# Patient Record
Sex: MALE | Race: WHITE | Employment: OTHER | URBAN - METROPOLITAN AREA
[De-identification: names, ages, dates, MRNs, and addresses within clinical notes are randomized per-mention and may not be internally consistent; named-entity substitution may affect disease eponyms.]

---

## 2017-01-23 ENCOUNTER — PATIENT MESSAGE (OUTPATIENT)
Dept: OTOLARYNGOLOGY | Facility: CLINIC | Age: 67
End: 2017-01-23

## 2017-01-23 NOTE — TELEPHONE ENCOUNTER
From: Kali Medina  To: Sundar Baxter MD  Sent: 1/23/2017 1:30 PM CST  Subject: Non-Urgent Medical Question    1.23.17    Dr Viktor Chin    Thank you again for seeing me and the Rxs. I will try to make a follow-up appointment.     Is there anything Rx you c

## 2017-02-02 RX ORDER — CIPROFLOXACIN 500 MG/1
TABLET, FILM COATED ORAL
Qty: 20 TABLET | Refills: 0 | OUTPATIENT
Start: 2017-02-02

## 2017-02-02 NOTE — TELEPHONE ENCOUNTER
Pt's LOV 16 with , chronic otitis externa. Pt states when he saw , culture was done on L ear; was told there is bacteria on L ear.   Pt states the Ciprodex and Ciprofloxacin helped back in 2016; pressure in L ear dropped and ear was dr

## 2017-02-03 NOTE — TELEPHONE ENCOUNTER
pt called. He understands meds will not be filled. Please call.   He still would like to speak with nurse

## 2017-02-06 ENCOUNTER — TELEPHONE (OUTPATIENT)
Dept: OTOLARYNGOLOGY | Facility: CLINIC | Age: 67
End: 2017-02-06

## 2017-02-06 NOTE — TELEPHONE ENCOUNTER
Pt's LOV 16, chronic otitis externa. Pt requested Ciprodex and Cipro refills. Per DIANA, pt should f/u w/ . Pt called today, states he is still having subtle ear aches, drainage from L ear.   Would like to know if  will give him refill of Cipro

## 2017-02-07 RX ORDER — CIPROFLOXACIN AND DEXAMETHASONE 3; 1 MG/ML; MG/ML
4 SUSPENSION/ DROPS AURICULAR (OTIC) 2 TIMES DAILY
Qty: 1 BOTTLE | Refills: 0 | Status: SHIPPED | OUTPATIENT
Start: 2017-02-07 | End: 2017-02-21

## 2017-02-07 RX ORDER — CIPROFLOXACIN 500 MG/1
500 TABLET, FILM COATED ORAL 2 TIMES DAILY
Qty: 20 TABLET | Refills: 0 | Status: SHIPPED | OUTPATIENT
Start: 2017-02-07 | End: 2017-02-17

## 2017-03-07 ENCOUNTER — TELEPHONE (OUTPATIENT)
Dept: OPHTHALMOLOGY | Facility: CLINIC | Age: 67
End: 2017-03-07

## 2017-03-07 NOTE — TELEPHONE ENCOUNTER
Pt requesting a new eyeglass Rx. But pt is asking if he will need a new exam? LOV: 8/10/2016 per Dr. Lexi Mehta notes, pt is to be seen w/Dr. Victoria Rios around 6/10/2017. Pt still requests to spk to Dr. Vicki Elder re: f/u appt. Please call, thank you.

## 2017-03-08 NOTE — TELEPHONE ENCOUNTER
Spoke to pt and explained that there was only a minimal change from the last Rx but improved his vision. OKd for pt to get the new glasses from June 2016 and if he feels like he wants to come in to see PPS sooner then his yearly exam to call our office.

## 2017-03-27 ENCOUNTER — PATIENT MESSAGE (OUTPATIENT)
Dept: INTERNAL MEDICINE CLINIC | Facility: CLINIC | Age: 67
End: 2017-03-27

## 2017-03-28 ENCOUNTER — PATIENT OUTREACH (OUTPATIENT)
Dept: INTERNAL MEDICINE CLINIC | Facility: CLINIC | Age: 67
End: 2017-03-28

## 2017-03-28 DIAGNOSIS — E11.9 TYPE 2 DIABETES MELLITUS WITHOUT COMPLICATION, WITHOUT LONG-TERM CURRENT USE OF INSULIN (HCC): Primary | ICD-10-CM

## 2017-03-28 NOTE — PROGRESS NOTES
Rec'd call from Juan Valladares . Pt had stopped by yesterday to schedule his initial diabetes education session.   Campos Olson is requesting order for pt to receive educational program.   Per Dr. Ryan Payton prior OV notes, pt is to schedule educatio

## 2017-03-28 NOTE — TELEPHONE ENCOUNTER
From: Eunice Bueno  To: Colten Whaley MD  Sent: 3/27/2017 4:01 PM CDT  Subject: Non-Urgent Medical Question    3.27.17    Dr Ji Common    Is it possible to get a referral to a dermatologist for moles and wart removal?  (I tried OTC - it just sits there and

## 2017-03-29 NOTE — TELEPHONE ENCOUNTER
Please provide contact info for Derm- Dr. Serenity Kendall and Dr. Martha Mitchell.  His insurance does not require a referral.

## 2017-04-13 ENCOUNTER — TELEPHONE (OUTPATIENT)
Dept: INTERNAL MEDICINE CLINIC | Facility: CLINIC | Age: 67
End: 2017-04-13

## 2017-04-13 DIAGNOSIS — E78.00 HYPERCHOLESTEROLEMIA: ICD-10-CM

## 2017-04-13 DIAGNOSIS — E11.9 TYPE 2 DIABETES MELLITUS WITHOUT COMPLICATION, WITHOUT LONG-TERM CURRENT USE OF INSULIN (HCC): Primary | ICD-10-CM

## 2017-04-13 DIAGNOSIS — Z00.00 PHYSICAL EXAM: ICD-10-CM

## 2017-04-17 ENCOUNTER — TELEPHONE (OUTPATIENT)
Dept: INTERNAL MEDICINE CLINIC | Facility: CLINIC | Age: 67
End: 2017-04-17

## 2017-04-17 NOTE — TELEPHONE ENCOUNTER
Okay to order CMP, CBC, glycohemoglobin, lipid profile, TSH with reflex to T4, and urine microalbumin.

## 2017-04-17 NOTE — TELEPHONE ENCOUNTER
LMTCB.  When pt calls back pls inform him that labs have been generated and placed in the system.   Pt needs to fast for 12 hours before getting labs done

## 2017-04-17 NOTE — TELEPHONE ENCOUNTER
Pt is asking for lab orders, pt states he has Px 4/19 and would like to get labs tomorrow morning please.

## 2017-04-18 ENCOUNTER — LAB ENCOUNTER (OUTPATIENT)
Dept: LAB | Age: 67
End: 2017-04-18
Attending: INTERNAL MEDICINE
Payer: MEDICARE

## 2017-04-18 DIAGNOSIS — E78.00 HYPERCHOLESTEROLEMIA: ICD-10-CM

## 2017-04-18 DIAGNOSIS — Z00.00 PHYSICAL EXAM: ICD-10-CM

## 2017-04-18 DIAGNOSIS — E11.9 TYPE 2 DIABETES MELLITUS WITHOUT COMPLICATION, WITHOUT LONG-TERM CURRENT USE OF INSULIN (HCC): ICD-10-CM

## 2017-04-18 PROCEDURE — 80061 LIPID PANEL: CPT

## 2017-04-18 PROCEDURE — 82570 ASSAY OF URINE CREATININE: CPT

## 2017-04-18 PROCEDURE — 80053 COMPREHEN METABOLIC PANEL: CPT

## 2017-04-18 PROCEDURE — 82043 UR ALBUMIN QUANTITATIVE: CPT

## 2017-04-18 PROCEDURE — 84443 ASSAY THYROID STIM HORMONE: CPT

## 2017-04-18 PROCEDURE — 36415 COLL VENOUS BLD VENIPUNCTURE: CPT

## 2017-04-18 PROCEDURE — 84439 ASSAY OF FREE THYROXINE: CPT

## 2017-04-18 PROCEDURE — 85025 COMPLETE CBC W/AUTO DIFF WBC: CPT

## 2017-04-18 PROCEDURE — 83036 HEMOGLOBIN GLYCOSYLATED A1C: CPT

## 2017-04-19 ENCOUNTER — OFFICE VISIT (OUTPATIENT)
Dept: INTERNAL MEDICINE CLINIC | Facility: CLINIC | Age: 67
End: 2017-04-19

## 2017-04-19 ENCOUNTER — HOSPITAL ENCOUNTER (OUTPATIENT)
Dept: ENDOCRINOLOGY | Facility: HOSPITAL | Age: 67
Discharge: HOME | End: 2017-04-19
Attending: INTERNAL MEDICINE
Payer: MEDICARE

## 2017-04-19 VITALS
SYSTOLIC BLOOD PRESSURE: 130 MMHG | HEART RATE: 61 BPM | DIASTOLIC BLOOD PRESSURE: 78 MMHG | WEIGHT: 228.63 LBS | HEIGHT: 74 IN | BODY MASS INDEX: 29.34 KG/M2

## 2017-04-19 VITALS — BODY MASS INDEX: 29.52 KG/M2 | WEIGHT: 230 LBS | HEIGHT: 74 IN

## 2017-04-19 DIAGNOSIS — E03.8 SUBCLINICAL HYPOTHYROIDISM: ICD-10-CM

## 2017-04-19 DIAGNOSIS — E11.69 DYSLIPIDEMIA ASSOCIATED WITH TYPE 2 DIABETES MELLITUS (HCC): ICD-10-CM

## 2017-04-19 DIAGNOSIS — E11.65 TYPE 2 DIABETES MELLITUS WITH HYPERGLYCEMIA, WITHOUT LONG-TERM CURRENT USE OF INSULIN (HCC): Primary | ICD-10-CM

## 2017-04-19 DIAGNOSIS — H40.003 GLAUCOMA SUSPECT, BILATERAL: ICD-10-CM

## 2017-04-19 DIAGNOSIS — E78.5 DYSLIPIDEMIA ASSOCIATED WITH TYPE 2 DIABETES MELLITUS (HCC): ICD-10-CM

## 2017-04-19 DIAGNOSIS — Z72.0 TOBACCO USE: ICD-10-CM

## 2017-04-19 DIAGNOSIS — E11.9 TYPE 2 DIABETES MELLITUS WITHOUT COMPLICATION, WITHOUT LONG-TERM CURRENT USE OF INSULIN (HCC): ICD-10-CM

## 2017-04-19 DIAGNOSIS — Z00.00 ANNUAL PHYSICAL EXAM: Primary | ICD-10-CM

## 2017-04-19 DIAGNOSIS — Z00.00 ENCOUNTER FOR ANNUAL HEALTH EXAMINATION: ICD-10-CM

## 2017-04-19 PROCEDURE — G0402 INITIAL PREVENTIVE EXAM: HCPCS | Performed by: INTERNAL MEDICINE

## 2017-04-19 RX ORDER — ATORVASTATIN CALCIUM 20 MG/1
TABLET, FILM COATED ORAL
Qty: 30 TABLET | Refills: 5 | Status: SHIPPED | OUTPATIENT
Start: 2017-04-19 | End: 2017-10-18

## 2017-04-19 NOTE — PROGRESS NOTES
HPI:   Ann Coe is a 73year old male who presents for a Medicare Initial Preventative Physical Exam (Welcome to Medicare- < 12 months on Medicare). He feels well this afternoon, and has no specific issues for discussion.   He went to his first se Lab Results  Component Value Date   WBC 7.2 04/18/2017   HGB 14.6 04/18/2017    04/18/2017        ALLERGIES:   He has No Known Allergies.     CURRENT MEDICATIONS:     Outpatient Prescriptions Marked as Taking for the 4/19/17 encounter (Office Bells Cigarettes. He has a 13 pack-year smoking history. He does not have any smokeless tobacco history on file. He reports that he does not drink alcohol or use illicit drugs.        REVIEW OF SYSTEMS:   GENERAL: No fever  LUNGS: No cough wheezing or shortness misunderstand what they say:  No   I misunderstand what others are saying and make inappropriate responses:    No I avoid social activities because I cannot hear well and fear I will   reply improperly:  No   Family members and friends have told me they th Ophthalmology in June for retinopathy screening. Tobacco cessation discussed and advised. Also recommend regular exercise. Return visit in 6 months for recheck and labs to include glycohemoglobin. Anticipate giving Prevnar at next visit.     Type 2 diab Functional Ability     Bathing or Showering: Able without help    Toileting: Able without help    Dressing: Able without help    Eating: Able without help    Driving: Able without help    Preparing your meals: Able without help    Managing money/bills: SERVICES  INDICATIONS AND SCHEDULE Internal Lab or Procedure External Lab or Procedure   Diabetes Screening      HbgA1C   Annually GLYCOHEMOGLOBIN (HGA1C) (L) (%)   Date Value   08/31/2016 6.6*     GLYCOHEMOGLOBIN (HGA1C) (%)   Date Value   04/18/2017 6.9* Annually POTASSIUM (mmol/L)   Date Value   04/18/2017 4.1     POTASSIUM (P) (mmol/L)   Date Value   04/21/2016 3.9    No flowsheet data found.     Creatinine  Annually CREATININE (mg/dL)   Date Value   04/18/2017 0.99     CREATININE (P) (mg/dL)   Date Value

## 2017-04-19 NOTE — PATIENT INSTRUCTIONS
Please continue your current medications. Continue with diabetes education, and begin checking your blood sugars at home soon. Please try to stop smoking. Please also try to exercise regularly. You will be due for a visit with Ophthalmology in June.   R Routine EKG is not a screening covered service except at the [place] to Medicare Visit    Abdominal aortic aneurysm screening (once between ages 73-68)  No results found for this or any previous visit.  Limited to patients who meet one of the following crit Risk No orders found for this or any previous visit.  Medium/high risk factors:   End-stage renal disease   Hemophiliacs who received Factor VIII or IX concentrates   Clients of institutions for the mentally retarded   Persons who live in the same house as

## 2017-04-19 NOTE — PROGRESS NOTES
Nasra Palacioarturo  : 1950 attended initial assessment for Diabetes Education:    Date: 2017   Start time: 1330 End time: 1430    Ht 74\"  Wt 230 lb  BMI 29.52 kg/m2      GLYCOHEMOGLOBIN (HGA1C) (L) (%)   Date Value   2016 6.6*   ----------

## 2017-04-20 ENCOUNTER — PATIENT MESSAGE (OUTPATIENT)
Dept: INTERNAL MEDICINE CLINIC | Facility: CLINIC | Age: 67
End: 2017-04-20

## 2017-04-21 ENCOUNTER — OFFICE VISIT (OUTPATIENT)
Dept: DERMATOLOGY CLINIC | Facility: CLINIC | Age: 67
End: 2017-04-21

## 2017-04-21 DIAGNOSIS — D22.9 MULTIPLE NEVI: ICD-10-CM

## 2017-04-21 DIAGNOSIS — D23.5 BENIGN NEOPLASM OF SKIN OF TRUNK, EXCEPT SCROTUM: ICD-10-CM

## 2017-04-21 DIAGNOSIS — L82.1 SEBORRHEIC KERATOSES: Primary | ICD-10-CM

## 2017-04-21 DIAGNOSIS — D23.70 BENIGN NEOPLASM OF SKIN OF LOWER LIMB, INCLUDING HIP, UNSPECIFIED LATERALITY: ICD-10-CM

## 2017-04-21 DIAGNOSIS — D23.60 BENIGN NEOPLASM OF SKIN OF UPPER LIMB, INCLUDING SHOULDER, UNSPECIFIED LATERALITY: ICD-10-CM

## 2017-04-21 DIAGNOSIS — D23.4 BENIGN NEOPLASM OF SCALP AND SKIN OF NECK: ICD-10-CM

## 2017-04-21 DIAGNOSIS — D23.30 BENIGN NEOPLASM OF SKIN OF FACE: ICD-10-CM

## 2017-04-21 PROCEDURE — G0463 HOSPITAL OUTPT CLINIC VISIT: HCPCS | Performed by: DERMATOLOGY

## 2017-04-21 PROCEDURE — 99202 OFFICE O/P NEW SF 15 MIN: CPT | Performed by: DERMATOLOGY

## 2017-05-08 NOTE — PROGRESS NOTES
Radha Vu is a 73year old male. HPI:     CC:  Patient presents with:  Lesion: New pt presents with raised lesion on left hand. Pt has tried multiple OTC wart treatments with no results.    Full Skin Exam: Pt requesting full skin exam. Pt concerned wit DAILY WITH MEALS Disp: 60 tablet Rfl: 5   Multiple Vitamins-Minerals (CENTRUM SILVER ADULT 50+) Oral Tab Take 1 tablet by mouth daily. Disp:  Rfl:    Calcium Carbonate-Vitamin D (CALTRATE 600+D OR) Take 1 tablet by mouth daily.  Disp:  Rfl:    Omega-3 Fatty There were no vitals filed for this visit. HPI:    Patient presents with:  Lesion: New pt presents with raised lesion on left hand. Pt has tried multiple OTC wart treatments with no results.    Full Skin Exam: Pt requesting full skin exam. Pt lexi shoulder, unspecified laterality  Benign neoplasm of skin of trunk, except scrotum  Benign neoplasm of skin of lower limb, including hip, unspecified laterality    See details on map. Remarkable for:    Lesion of concern is benign.   Seborrheic keratos

## 2017-05-11 ENCOUNTER — TELEPHONE (OUTPATIENT)
Dept: INTERNAL MEDICINE CLINIC | Facility: CLINIC | Age: 67
End: 2017-05-11

## 2017-05-11 ENCOUNTER — HOSPITAL ENCOUNTER (OUTPATIENT)
Dept: ENDOCRINOLOGY | Facility: HOSPITAL | Age: 67
Discharge: HOME | End: 2017-05-11
Attending: INTERNAL MEDICINE
Payer: MEDICARE

## 2017-05-11 VITALS — WEIGHT: 229.69 LBS | BODY MASS INDEX: 29 KG/M2

## 2017-05-11 PROBLEM — E11.9 TYPE 2 DIABETES MELLITUS (HCC): Status: RESOLVED | Noted: 2017-04-19 | Resolved: 2017-05-11

## 2017-05-11 NOTE — TELEPHONE ENCOUNTER
Please see my chart message:        With Provider: Gee Sierra MD [-Primary Care Physician-]    Preferred Date Range: Any date 5/11/2017 or later    Preferred Times: Any    Reason: To address the following health maintenance concerns.   Adult Pneumonia V

## 2017-05-11 NOTE — PROGRESS NOTES
Ann Coe  DOB12/27/1950 attended Diabetes Education Group Class 1:    Date: 5/11/2017  Start time: 1 PM End time: 3 PM    He is currently monitoring blood sugars BID, FBS: 135-178 mg/dl, Pre-dinner:  mg/dl  He eats 2 meals/day that are 8-9 hrs a

## 2017-05-12 ENCOUNTER — TELEPHONE (OUTPATIENT)
Dept: INTERNAL MEDICINE CLINIC | Facility: CLINIC | Age: 67
End: 2017-05-12

## 2017-05-12 NOTE — TELEPHONE ENCOUNTER
Pt requesting pneumonia injection. Pneumovax 23 given on 5/3/16. Ok to schedule nurse visit for Prevnar 13 injection?

## 2017-05-12 NOTE — TELEPHONE ENCOUNTER
Karen Medicare Billing needs form dated 4/24- physician order diabetic form amended  States Line 3- info changed from 2 to 1- need  Initialed  and date  Medicare will not accept any changes unless initialed & dated  Return FAX# [phone]

## 2017-05-15 ENCOUNTER — NURSE ONLY (OUTPATIENT)
Dept: INTERNAL MEDICINE CLINIC | Facility: CLINIC | Age: 67
End: 2017-05-15

## 2017-05-15 DIAGNOSIS — Z23 NEED FOR PNEUMOCOCCAL VACCINATION: Primary | ICD-10-CM

## 2017-05-15 PROCEDURE — 90670 PCV13 VACCINE IM: CPT | Performed by: INTERNAL MEDICINE

## 2017-05-15 PROCEDURE — G0009 ADMIN PNEUMOCOCCAL VACCINE: HCPCS | Performed by: INTERNAL MEDICINE

## 2017-05-17 ENCOUNTER — OFFICE VISIT (OUTPATIENT)
Dept: PODIATRY CLINIC | Facility: CLINIC | Age: 67
End: 2017-05-17

## 2017-05-17 DIAGNOSIS — M72.2 PLANTAR FASCIITIS OF RIGHT FOOT: ICD-10-CM

## 2017-05-17 DIAGNOSIS — E11.9 TYPE 2 DIABETES MELLITUS WITHOUT COMPLICATION, WITHOUT LONG-TERM CURRENT USE OF INSULIN (HCC): Primary | ICD-10-CM

## 2017-05-17 PROCEDURE — G0463 HOSPITAL OUTPT CLINIC VISIT: HCPCS | Performed by: PODIATRIST

## 2017-05-17 PROCEDURE — 99203 OFFICE O/P NEW LOW 30 MIN: CPT | Performed by: PODIATRIST

## 2017-05-17 NOTE — PROGRESS NOTES
HPI:    Patient ID: Jasbir Guillory is a 73year old male. HPI  This pleasant 21-year-old male presents as a new patient to me on referral from [address] [place] [address]. Patient states that he is here for an evaluation because of diabetes.   Patient states that he has be Rfl:      Allergies:No Known Allergies   PHYSICAL EXAM:   Physical Exam  On physical exam pedal pulses are normal.  There is no clinical evidence of edema nor erythema. Skin texture is good and hair growth is noted on his toes.   His nails are appropriatel

## 2017-05-18 ENCOUNTER — HOSPITAL ENCOUNTER (OUTPATIENT)
Dept: ENDOCRINOLOGY | Facility: HOSPITAL | Age: 67
Discharge: HOME | End: 2017-05-18
Attending: INTERNAL MEDICINE
Payer: MEDICARE

## 2017-05-18 DIAGNOSIS — E11.9 TYPE 2 DIABETES MELLITUS WITHOUT COMPLICATION, WITHOUT LONG-TERM CURRENT USE OF INSULIN (HCC): Primary | ICD-10-CM

## 2017-05-18 NOTE — ADDENDUM NOTE
Encounter addended by: Sophy Aponte on: 5/18/2017  3:47 PM<BR>     Documentation filed: Problem List

## 2017-05-18 NOTE — PROGRESS NOTES
Carlita Isidro  : 1950 attended Diabetes Education Group Class 2:    Date: 2017  Start time: 1300  End time: 1500    Weight: 229.6#    Blood Glucose  FBG = 135-164  Pre-D = 100-125    Healthy eating for diabetes including carbohydrate counting

## 2017-05-24 ENCOUNTER — TELEPHONE (OUTPATIENT)
Dept: INTERNAL MEDICINE CLINIC | Facility: CLINIC | Age: 67
End: 2017-05-24

## 2017-05-24 DIAGNOSIS — E11.65 TYPE 2 DIABETES MELLITUS WITH HYPERGLYCEMIA, WITHOUT LONG-TERM CURRENT USE OF INSULIN (HCC): Primary | ICD-10-CM

## 2017-05-24 NOTE — TELEPHONE ENCOUNTER
Pt calling regarding refill for Glucose Blood (SHRUTI CONTOUR NEXT TEST) In Vitro Strip. .. please advise       Current Outpatient Prescriptions:  Glucose Blood (SHRUTI CONTOUR NEXT TEST) In Vitro Strip Use as directed.   Test BIB before a meal. Disp: 100 strip

## 2017-05-25 ENCOUNTER — OFFICE VISIT (OUTPATIENT)
Dept: SURGERY | Facility: CLINIC | Age: 67
End: 2017-05-25

## 2017-05-25 ENCOUNTER — HOSPITAL ENCOUNTER (OUTPATIENT)
Dept: ENDOCRINOLOGY | Facility: HOSPITAL | Age: 67
Discharge: HOME | End: 2017-05-25
Attending: INTERNAL MEDICINE
Payer: MEDICARE

## 2017-05-25 VITALS
TEMPERATURE: 98 F | DIASTOLIC BLOOD PRESSURE: 85 MMHG | SYSTOLIC BLOOD PRESSURE: 143 MMHG | WEIGHT: 229 LBS | RESPIRATION RATE: 16 BRPM | BODY MASS INDEX: 29.39 KG/M2 | HEIGHT: 74 IN | HEART RATE: 79 BPM

## 2017-05-25 DIAGNOSIS — E11.9 TYPE 2 DIABETES MELLITUS WITHOUT COMPLICATION, WITHOUT LONG-TERM CURRENT USE OF INSULIN (HCC): Primary | ICD-10-CM

## 2017-05-25 DIAGNOSIS — N20.0 KIDNEY STONES: ICD-10-CM

## 2017-05-25 DIAGNOSIS — R35.0 URINARY FREQUENCY: Primary | ICD-10-CM

## 2017-05-25 DIAGNOSIS — R82.90 URINE FINDING: ICD-10-CM

## 2017-05-25 PROCEDURE — 81003 URINALYSIS AUTO W/O SCOPE: CPT | Performed by: UROLOGY

## 2017-05-25 PROCEDURE — 99204 OFFICE O/P NEW MOD 45 MIN: CPT | Performed by: UROLOGY

## 2017-05-25 PROCEDURE — G0463 HOSPITAL OUTPT CLINIC VISIT: HCPCS | Performed by: UROLOGY

## 2017-05-25 NOTE — PROGRESS NOTES
SUBJECTIVE:  Jasbir Guillory is a 73year old male who presents for a consultation at the request of, and a copy of this note will be sent to, Dr. Eric Barragan, for evaluation of  Urinary frequency and ? History of kidney stones.     He states he has frequency on co nocturia, or claudication. GASTROINTESTINAL:  Negative for nausea, vomiting, diarrhea, constipation, heartburn or indigestion, abdominal pains, bloody or tarry stools.   GENERAL: Denies:  weight gain, weight loss, fever, night sweats, bone pain, malaise an

## 2017-05-30 ENCOUNTER — HOSPITAL ENCOUNTER (OUTPATIENT)
Dept: ULTRASOUND IMAGING | Facility: HOSPITAL | Age: 67
Discharge: HOME | End: 2017-05-30
Attending: UROLOGY
Payer: MEDICARE

## 2017-05-30 DIAGNOSIS — R35.0 URINARY FREQUENCY: ICD-10-CM

## 2017-05-30 DIAGNOSIS — N20.0 KIDNEY STONES: ICD-10-CM

## 2017-05-30 PROCEDURE — 76770 US EXAM ABDO BACK WALL COMP: CPT | Performed by: UROLOGY

## 2017-06-01 ENCOUNTER — HOSPITAL ENCOUNTER (OUTPATIENT)
Dept: ENDOCRINOLOGY | Facility: HOSPITAL | Age: 67
Discharge: HOME | End: 2017-06-01
Attending: INTERNAL MEDICINE
Payer: MEDICARE

## 2017-06-01 ENCOUNTER — TELEPHONE (OUTPATIENT)
Dept: ENDOCRINOLOGY | Facility: HOSPITAL | Age: 67
End: 2017-06-01

## 2017-06-01 DIAGNOSIS — E11.69 DYSLIPIDEMIA ASSOCIATED WITH TYPE 2 DIABETES MELLITUS (HCC): Primary | ICD-10-CM

## 2017-06-01 DIAGNOSIS — E78.5 DYSLIPIDEMIA ASSOCIATED WITH TYPE 2 DIABETES MELLITUS (HCC): Primary | ICD-10-CM

## 2017-06-01 NOTE — PROGRESS NOTES
Qiana Laguerre  : 1950 attended Diabetes Education Group Class 4:     Date: 2017  Start time: 1300  End time: 1500    Weight: 230.3#     Reviewed information covered in previous classes including benefits of maintaining good blood glucose contr

## 2017-06-01 NOTE — TELEPHONE ENCOUNTER
----- Message from [facility] sent at 6/1/2017 12:48 PM CDT -----  Regarding: CGM  Patient would like to have the CGM you recommended.    Archana Goldstein

## 2017-06-13 ENCOUNTER — OFFICE VISIT (OUTPATIENT)
Dept: OPTOMETRY | Facility: CLINIC | Age: 67
End: 2017-06-13

## 2017-06-13 ENCOUNTER — OFFICE VISIT (OUTPATIENT)
Dept: SURGERY | Facility: CLINIC | Age: 67
End: 2017-06-13

## 2017-06-13 VITALS
RESPIRATION RATE: 16 BRPM | DIASTOLIC BLOOD PRESSURE: 78 MMHG | HEIGHT: 72 IN | SYSTOLIC BLOOD PRESSURE: 110 MMHG | WEIGHT: 230 LBS | BODY MASS INDEX: 31.15 KG/M2 | TEMPERATURE: 98 F | HEART RATE: 73 BPM

## 2017-06-13 DIAGNOSIS — E11.9 TYPE 2 DIABETES MELLITUS WITHOUT COMPLICATION, WITHOUT LONG-TERM CURRENT USE OF INSULIN (HCC): Primary | ICD-10-CM

## 2017-06-13 DIAGNOSIS — R35.0 URINARY FREQUENCY: Primary | ICD-10-CM

## 2017-06-13 DIAGNOSIS — H25.13 AGE-RELATED NUCLEAR CATARACT OF BOTH EYES: ICD-10-CM

## 2017-06-13 DIAGNOSIS — N20.0 KIDNEY STONES: ICD-10-CM

## 2017-06-13 PROCEDURE — 99213 OFFICE O/P EST LOW 20 MIN: CPT | Performed by: UROLOGY

## 2017-06-13 PROCEDURE — G0463 HOSPITAL OUTPT CLINIC VISIT: HCPCS | Performed by: UROLOGY

## 2017-06-13 PROCEDURE — 92014 COMPRE OPH EXAM EST PT 1/>: CPT | Performed by: OPTOMETRIST

## 2017-06-13 NOTE — PATIENT INSTRUCTIONS
Diabetes mellitus, type 2 ([facility].)  I advised patient that there is no background diabetic retinopathy in either eye and that they should continue to keep their blood sugar under control and continue to see their physician as directed.  I stressed the importan

## 2017-06-13 NOTE — PROGRESS NOTES
Ann Coe is a 73year old male. HPI:     HPI     Diabetic Eye Exam   Diabetes characteristics include controlled with diet, taking oral medications and Type 2. Duration of 1 year.            Comments   Patient is in for an annual diabetic eye exam. MG) BY MOUTH EVERY EVENING Disp: 30 tablet Rfl: 5   MetFORMIN HCl 500 MG Oral Tab TAKE 1 TABLET(500 MG) BY MOUTH TWICE DAILY WITH MEALS Disp: 60 tablet Rfl: 5   Multiple Vitamins-Minerals (CENTRUM SILVER ADULT 50+) Oral Tab Take 1 tablet by mouth daily.  Tequila Traore pinguecula    Cornea Clear Clear    Anterior Chamber Deep and quiet Deep and quiet    Iris Normal Normal    Lens 1+ Nuclear sclerosis , Vacuoles 1+ Nuclear sclerosis    Vitreous Clear Clear      Fundus Exam      Right Left    Disc Normal Normal    C/D Rati

## 2017-06-13 NOTE — PROGRESS NOTES
Ghazal Jain is a 73year old male. HPI:     HPI     Diabetic Eye Exam   Diabetes characteristics include controlled with diet, taking oral medications and Type 2. Duration of 1 year.            Comments   Patient is in for an annual diabetic eye exam. MG) BY MOUTH EVERY EVENING Disp: 30 tablet Rfl: 5   MetFORMIN HCl 500 MG Oral Tab TAKE 1 TABLET(500 MG) BY MOUTH TWICE DAILY WITH MEALS Disp: 60 tablet Rfl: 5   Multiple Vitamins-Minerals (CENTRUM SILVER ADULT 50+) Oral Tab Take 1 tablet by mouth daily.  Jerardo Crest pinguecula    Cornea Clear Clear    Anterior Chamber Deep and quiet Deep and quiet    Iris Normal Normal    Lens 1+ Nuclear sclerosis , Vacuoles 1+ Nuclear sclerosis    Vitreous Clear Clear      Fundus Exam      Right Left    Disc Normal Normal    C/D Rati

## 2017-06-14 NOTE — PROGRESS NOTES
Rajani Atkins is a 73year old male. HPI:   Patient presents with:  Urinary Symptoms (urologic): No change in symptoms. Nocturia 2x per night and daytime frequency      73year old male here for followup to a visit 5/25/2017.   Saw me for ruling out aileen 100 strip Rfl: 11   SHRUTI MICROLET LANCETS Does not apply Misc 1 lancet by Finger stick route 2 (two) times daily. Use as directed.  Disp: 1 Box Rfl: 11   Atorvastatin Calcium 20 MG Oral Tab TAKE 1 TABLET(20 MG) BY MOUTH EVERY EVENING Disp: 30 tablet Rfl: 5

## 2017-08-30 ENCOUNTER — HOSPITAL ENCOUNTER (OUTPATIENT)
Age: 67
Discharge: HOME | End: 2017-08-30
Attending: EMERGENCY MEDICINE
Payer: MEDICARE

## 2017-08-30 VITALS
DIASTOLIC BLOOD PRESSURE: 72 MMHG | RESPIRATION RATE: 18 BRPM | WEIGHT: 226 LBS | OXYGEN SATURATION: 99 % | BODY MASS INDEX: 31 KG/M2 | SYSTOLIC BLOOD PRESSURE: 152 MMHG | TEMPERATURE: 98 F | HEART RATE: 87 BPM

## 2017-08-30 DIAGNOSIS — H65.02 ACUTE SEROUS OTITIS MEDIA OF LEFT EAR, RECURRENCE NOT SPECIFIED: Primary | ICD-10-CM

## 2017-08-30 DIAGNOSIS — R21 RASH: ICD-10-CM

## 2017-08-30 PROCEDURE — 99202 OFFICE O/P NEW SF 15 MIN: CPT

## 2017-08-30 PROCEDURE — 69210 REMOVE IMPACTED EAR WAX UNI: CPT

## 2017-08-30 PROCEDURE — 99212 OFFICE O/P EST SF 10 MIN: CPT

## 2017-08-30 RX ORDER — FLUTICASONE PROPIONATE 50 MCG
1-2 SPRAY, SUSPENSION (ML) NASAL DAILY
Qty: 16 G | Refills: 0 | Status: SHIPPED | OUTPATIENT
Start: 2017-08-30 | End: 2017-09-29

## 2017-08-30 NOTE — ED NOTES
Discharge home with inst. And follow up care. To call and see pcp in office in 3 days go to the ed for new or worse concerns. If rash spreads/fever pain.

## 2017-08-30 NOTE — ED INITIAL ASSESSMENT (HPI)
Left ear pain sore throat for few days no fever now back of head hurts. ? Hard of hearing. Taking old cipro otic drops for comfort w/o relief. Pt covered with small rounf red pink rash all over body chest face back legs.  Non labored resp speech slow but cl

## 2017-08-30 NOTE — ED PROVIDER NOTES
Patient Seen in: [facility] In [address] [place] [address]    History   Patient presents with:  Ear Problem    Stated Complaint: sorethroat/ear pain    HPI  Patient complains of 2-3 days of left ear pain, posterior headache and feeling \"blah\".   Jaja MEALS   Multiple Vitamins-Minerals (CENTRUM SILVER ADULT 50+) Oral Tab,  Take 1 tablet by mouth daily. Calcium Carbonate-Vitamin D (CALTRATE 600+D OR),  Take 1 tablet by mouth daily.    Omega-3 Fatty Acids (FISH OIL OR),  Take 1 capsule by mouth 2 (two) t around room. HENT:   Head: Normocephalic and atraumatic. Head is without raccoon's eyes and without Bragg's sign. Right Ear: External ear normal. No mastoid tenderness. Tympanic membrane is injected (minimal).  Tympanic membrane is not retracted and no rashes in the past.  Dr. Kemar Ku said he would be able to reevaluate the patient in 2 days.   Patient was instructed by me to go to the emergency department for further metabolic evaluation but as patient is refusing I will and again instruct him on close obs

## 2017-09-01 ENCOUNTER — OFFICE VISIT (OUTPATIENT)
Dept: INTERNAL MEDICINE CLINIC | Facility: CLINIC | Age: 67
End: 2017-09-01

## 2017-09-01 VITALS
HEIGHT: 72 IN | DIASTOLIC BLOOD PRESSURE: 78 MMHG | HEART RATE: 71 BPM | WEIGHT: 223 LBS | SYSTOLIC BLOOD PRESSURE: 130 MMHG | BODY MASS INDEX: 30.2 KG/M2

## 2017-09-01 DIAGNOSIS — H92.02 LEFT EAR PAIN: Primary | ICD-10-CM

## 2017-09-01 PROCEDURE — G0463 HOSPITAL OUTPT CLINIC VISIT: HCPCS | Performed by: INTERNAL MEDICINE

## 2017-09-01 PROCEDURE — 99213 OFFICE O/P EST LOW 20 MIN: CPT | Performed by: INTERNAL MEDICINE

## 2017-09-01 RX ORDER — AMOXICILLIN 875 MG/1
875 TABLET, COATED ORAL 2 TIMES DAILY
Qty: 20 TABLET | Refills: 0 | Status: SHIPPED | OUTPATIENT
Start: 2017-09-01 | End: 2017-09-11

## 2017-09-01 NOTE — PATIENT INSTRUCTIONS
Please take amoxicillin 875 mg twice daily for 10 days. Call if no better. Return visit in October as planned.

## 2017-09-01 NOTE — PROGRESS NOTES
Ghazal Jain is a 73year old male. Patient presents with:  Ear Pain    HPI:   Mr. Baljinder Nick presents this afternoon for Immediate Care follow-up.     Last weekend, about 5-6 days ago, he developed symptoms of nasal congestion, postnasal drip, left ear pain, Carbonate-Vitamin D (CALTRATE 600+D OR) Take 1 tablet by mouth daily. Disp:  Rfl:    Omega-3 Fatty Acids (FISH OIL OR) Take 1 capsule by mouth 2 (two) times daily. Disp:  Rfl:    OCUVITE Oral Tab Take 1 tablet by mouth daily.  Disp:  Rfl:    aspirin 81 MG O medical conditions. The patient indicates understanding of these issues and agrees to the plan.     Kimberly Pablo MD  9/1/2017  2:01 PM

## 2017-09-15 ENCOUNTER — MED REC SCAN ONLY (OUTPATIENT)
Dept: INTERNAL MEDICINE CLINIC | Facility: CLINIC | Age: 67
End: 2017-09-15

## 2017-10-10 ENCOUNTER — TELEPHONE (OUTPATIENT)
Dept: OTHER | Age: 67
End: 2017-10-10

## 2017-10-10 DIAGNOSIS — E11.69 DYSLIPIDEMIA ASSOCIATED WITH TYPE 2 DIABETES MELLITUS (HCC): Primary | ICD-10-CM

## 2017-10-10 DIAGNOSIS — E78.5 DYSLIPIDEMIA ASSOCIATED WITH TYPE 2 DIABETES MELLITUS (HCC): Primary | ICD-10-CM

## 2017-10-10 RX ORDER — FLUTICASONE PROPIONATE 50 MCG
1-2 SPRAY, SUSPENSION (ML) NASAL DAILY
Qty: 3 BOTTLE | Refills: 0 | Status: SHIPPED | OUTPATIENT
Start: 2017-10-10 | End: 2018-01-08

## 2017-10-10 NOTE — TELEPHONE ENCOUNTER
Pt states he has an appt scheduled 10/181/7, pt is asking to have lab orders placed so he can have them done prior to this visit. Noted last labs 4/18/17.     Please advise

## 2017-10-10 NOTE — TELEPHONE ENCOUNTER
Dr Simone Davis, please advise. Pt was prescribed Flonase in the IC and it is cheaper for him to get it with a prescription than to buy it OTC. Pt asking if you will approve pended order to pharmacy on file. No call back needed if approved.

## 2017-10-10 NOTE — TELEPHONE ENCOUNTER
Pt returned call, reviewed lab orders per doctor's instructions. Pt had no further questions at this time.

## 2017-10-11 ENCOUNTER — APPOINTMENT (OUTPATIENT)
Dept: LAB | Age: 67
End: 2017-10-11
Attending: INTERNAL MEDICINE
Payer: MEDICARE

## 2017-10-11 DIAGNOSIS — E11.69 DYSLIPIDEMIA ASSOCIATED WITH TYPE 2 DIABETES MELLITUS (HCC): ICD-10-CM

## 2017-10-11 DIAGNOSIS — E78.5 DYSLIPIDEMIA ASSOCIATED WITH TYPE 2 DIABETES MELLITUS (HCC): ICD-10-CM

## 2017-10-11 PROCEDURE — 83036 HEMOGLOBIN GLYCOSYLATED A1C: CPT

## 2017-10-11 PROCEDURE — 36415 COLL VENOUS BLD VENIPUNCTURE: CPT

## 2017-10-13 ENCOUNTER — PATIENT OUTREACH (OUTPATIENT)
Dept: INTERNAL MEDICINE CLINIC | Facility: CLINIC | Age: 67
End: 2017-10-13

## 2017-10-18 ENCOUNTER — OFFICE VISIT (OUTPATIENT)
Dept: INTERNAL MEDICINE CLINIC | Facility: CLINIC | Age: 67
End: 2017-10-18

## 2017-10-18 VITALS
SYSTOLIC BLOOD PRESSURE: 132 MMHG | HEART RATE: 64 BPM | WEIGHT: 220 LBS | BODY MASS INDEX: 29.8 KG/M2 | DIASTOLIC BLOOD PRESSURE: 82 MMHG | HEIGHT: 72 IN

## 2017-10-18 DIAGNOSIS — E11.9 TYPE 2 DIABETES MELLITUS WITHOUT COMPLICATION, WITHOUT LONG-TERM CURRENT USE OF INSULIN (HCC): Primary | ICD-10-CM

## 2017-10-18 PROCEDURE — G0463 HOSPITAL OUTPT CLINIC VISIT: HCPCS | Performed by: INTERNAL MEDICINE

## 2017-10-18 PROCEDURE — 99213 OFFICE O/P EST LOW 20 MIN: CPT | Performed by: INTERNAL MEDICINE

## 2017-10-18 RX ORDER — ATORVASTATIN CALCIUM 20 MG/1
TABLET, FILM COATED ORAL
Qty: 90 TABLET | Refills: 3 | Status: SHIPPED | OUTPATIENT
Start: 2017-10-18 | End: 2018-10-10

## 2017-10-18 NOTE — PROGRESS NOTES
Ryan Verdugo is a 73year old male. Patient presents with:  Diabetes    HPI:   Mr. Tasha Calle presents this afternoon for follow-up of type 2 diabetes. He feels well. Diet now healthier since attending diabetes classes.   No routine exercise, but we Rfl:    DiphenhydrAMINE HCl 25 MG Oral Tab Take 25 mg by mouth 2 (two) times daily.  Disp:  Rfl:      No Known Allergies   Past Medical History:   Diagnosis Date   • Arthritis    • Dyslipidemia associated with type 2 diabetes mellitus ([facility]    • Essential h

## 2017-10-18 NOTE — PATIENT INSTRUCTIONS
Please continue your current medication. Please continue to eat healthy, and try to exercise regularly and quit smoking. Please schedule a physical in 6 months.

## 2017-10-18 NOTE — TELEPHONE ENCOUNTER
Diabetes Medications  Protocol Criteria:  · Appointment scheduled in the past 6 months or the next 3 months  · A1C < 7.5 in the past 6 months  · Creatinine in the past 12 months  · Creatinine result < 1.5   Recent Outpatient Visits            Today Type 2 without long-term current use of insulin [facility])    [facility] for SANDRA [place]    Office Visit    4 months ago Urinary frequency    [facility] for Jaleesa [place]    Office Visit

## 2017-10-19 ENCOUNTER — PATIENT OUTREACH (OUTPATIENT)
Dept: INTERNAL MEDICINE CLINIC | Facility: CLINIC | Age: 67
End: 2017-10-19

## 2017-10-26 ENCOUNTER — PATIENT OUTREACH (OUTPATIENT)
Dept: INTERNAL MEDICINE CLINIC | Facility: CLINIC | Age: 67
End: 2017-10-26

## 2017-11-02 ENCOUNTER — PATIENT OUTREACH (OUTPATIENT)
Dept: INTERNAL MEDICINE CLINIC | Facility: CLINIC | Age: 67
End: 2017-11-02

## 2017-11-02 NOTE — PROGRESS NOTES
[facility] to [phone], NCM contact information provided.

## 2017-11-08 ENCOUNTER — PATIENT OUTREACH (OUTPATIENT)
Dept: CASE MANAGEMENT | Age: 67
End: 2017-11-08

## 2017-11-08 DIAGNOSIS — E78.5 DYSLIPIDEMIA ASSOCIATED WITH TYPE 2 DIABETES MELLITUS (HCC): ICD-10-CM

## 2017-11-08 DIAGNOSIS — H25.13 AGE-RELATED NUCLEAR CATARACT OF BOTH EYES: ICD-10-CM

## 2017-11-08 DIAGNOSIS — E11.9 TYPE 2 DIABETES MELLITUS WITHOUT COMPLICATION, WITHOUT LONG-TERM CURRENT USE OF INSULIN (HCC): ICD-10-CM

## 2017-11-08 DIAGNOSIS — E11.69 DYSLIPIDEMIA ASSOCIATED WITH TYPE 2 DIABETES MELLITUS (HCC): ICD-10-CM

## 2017-11-08 PROCEDURE — 99490 CHRNC CARE MGMT STAFF 1ST 20: CPT | Performed by: INTERNAL MEDICINE

## 2017-11-08 NOTE — PROGRESS NOTES
Spoke to Paige at [facility] about CCM, HIPAA verified, current care plan and performed CCM assessment.  Reviewed pt Patient Active Problem List:     Dyslipidemia associated with type 2 diabetes mellitus ([facility]     Subclinical hypothyroidism     Tobacco use mouth, cannot pin point location, describes as \"general pain\". Has no dental insurance, tried to go to a dental school for examination but states \"that didn't work\". Current Issues:   1.  Left thumb pain- taking ibuprophen 1 tab prn.   2. Lack of de that.         Goals:   What would you say is your biggest concerns about your health? Would like to see a dentist, new shoes such as \"new balance\" , new glasses but has financial concerns, new toothbrush.       What steps do you think you could take to wo

## 2017-12-05 ENCOUNTER — PATIENT OUTREACH (OUTPATIENT)
Dept: CASE MANAGEMENT | Age: 67
End: 2017-12-05

## 2017-12-05 DIAGNOSIS — H40.009 GLAUCOMA SUSPECT, UNSPECIFIED LATERALITY: ICD-10-CM

## 2017-12-05 DIAGNOSIS — E11.9 TYPE 2 DIABETES MELLITUS WITHOUT COMPLICATION, WITHOUT LONG-TERM CURRENT USE OF INSULIN (HCC): ICD-10-CM

## 2017-12-05 DIAGNOSIS — H25.13 AGE-RELATED NUCLEAR CATARACT OF BOTH EYES: ICD-10-CM

## 2017-12-05 PROCEDURE — 99490 CHRNC CARE MGMT STAFF 1ST 20: CPT | Performed by: INTERNAL MEDICINE

## 2017-12-05 NOTE — PROGRESS NOTES
Chart review completed as well as research on low cost/no cost dental services in the [facility]. Also researched low cost/no cost vision screening and eye glass providers in [facility]. Call to patient, Shante Elliott ([id number]).    name

## 2017-12-07 NOTE — PROGRESS NOTES
Call back to pt. Left detailed vmm for him that with diabetes, his medicare policy should cover the cost of an eye exam and perhaps his eye dr. Would have some resources for low cost/ no cost progressive lens.     He had verbalized in prior call that he was

## 2017-12-07 NOTE — PROGRESS NOTES
Spoke to Paige at [facility] about CCM, HIPAA verified, current care plan and performed CCM assessment.     Patient Active Problem List:     Dyslipidemia associated with type 2 diabetes mellitus ([facility]     Subclinical hypothyroidism     Tobacco use     Glaucoma for pt goals. Resources needed: Provided pt with resources for dental and vision/eyeglass issue. Time Spent This Encounter Total: 17 min medical record review, telephone communication, care plan updates where needed, and education.  Provided acknowle

## 2017-12-07 NOTE — PROGRESS NOTES
[facility] outreach call made to pt, Halle Le. ([phone]).  name and number provided for further follow up.

## 2018-01-08 ENCOUNTER — PATIENT OUTREACH (OUTPATIENT)
Dept: CASE MANAGEMENT | Age: 68
End: 2018-01-08

## 2018-01-08 RX ORDER — FLUTICASONE PROPIONATE 50 MCG
SPRAY, SUSPENSION (ML) NASAL
Qty: 1 INHALER | Refills: 3 | Status: SHIPPED | OUTPATIENT
Start: 2018-01-08 | End: 2018-08-13

## 2018-01-08 NOTE — PROGRESS NOTES
Spoke to Paige at [facility] about CCM, HIPAA verified, current care plan and performed CCM assessment.   Patient Active Problem List:     Dyslipidemia associated with type 2 diabetes mellitus ([facility]     Subclinical hypothyroidism     Tobacco use     Glaucoma s this time. Time Spent This Encounter Total:  8 min medical record review, telephone communication, care plan updates where needed, and education. Provided acknowledgment and validation to patient's concerns.      Monthly Minute Total including today: 11

## 2018-01-08 NOTE — PROGRESS NOTES
Chart reviewed. [facility]. ([id number]).  name and number provided for further follow up. Time Spent This Encounter Total: 3 min medical record review, telephone,  communication.        Monthly Minute Total including today: 3

## 2018-01-30 ENCOUNTER — PATIENT OUTREACH (OUTPATIENT)
Dept: CASE MANAGEMENT | Age: 68
End: 2018-01-30

## 2018-01-30 DIAGNOSIS — E11.69 DYSLIPIDEMIA ASSOCIATED WITH TYPE 2 DIABETES MELLITUS (HCC): ICD-10-CM

## 2018-01-30 DIAGNOSIS — E11.9 TYPE 2 DIABETES MELLITUS WITHOUT COMPLICATION, WITHOUT LONG-TERM CURRENT USE OF INSULIN (HCC): ICD-10-CM

## 2018-01-30 DIAGNOSIS — E78.5 DYSLIPIDEMIA ASSOCIATED WITH TYPE 2 DIABETES MELLITUS (HCC): ICD-10-CM

## 2018-01-30 PROCEDURE — 99490 CHRNC CARE MGMT STAFF 1ST 20: CPT

## 2018-01-30 NOTE — PROGRESS NOTES
Received call back from pt. Provided him with info on [address] ([address], [place], [address])   Discussed food pantries in the [address] Offered 4 options of pantry's to patient.  He declined information stating he is

## 2018-01-30 NOTE — PROGRESS NOTES
Researched food pantries and info on [employer]. [facility] ([id number]).  name and number provided for further follow up. Time Spent This Encounter Total: 6 min medical record review, telephone,  communication.          Monthly Minute

## 2018-01-30 NOTE — PROGRESS NOTES
Rec'd [facility] pt [facility] Dr. Breana Lopez for his back and also a scan for his left thumb. Also requesting number for dental assistance program in [place] that we had discussed before.    Asking about resources for l

## 2018-01-31 ENCOUNTER — OFFICE VISIT (OUTPATIENT)
Dept: INTERNAL MEDICINE CLINIC | Facility: CLINIC | Age: 68
End: 2018-01-31

## 2018-01-31 ENCOUNTER — HOSPITAL ENCOUNTER (OUTPATIENT)
Dept: GENERAL RADIOLOGY | Facility: HOSPITAL | Age: 68
Discharge: HOME | End: 2018-01-31
Attending: INTERNAL MEDICINE | Admitting: INTERNAL MEDICINE
Payer: MEDICARE

## 2018-01-31 VITALS
SYSTOLIC BLOOD PRESSURE: 136 MMHG | DIASTOLIC BLOOD PRESSURE: 82 MMHG | HEART RATE: 64 BPM | HEIGHT: 72 IN | WEIGHT: 218 LBS | BODY MASS INDEX: 29.53 KG/M2

## 2018-01-31 DIAGNOSIS — M79.645 CHRONIC PAIN OF LEFT THUMB: ICD-10-CM

## 2018-01-31 DIAGNOSIS — G89.29 CHRONIC PAIN OF LEFT THUMB: ICD-10-CM

## 2018-01-31 DIAGNOSIS — R61 NIGHT SWEATS: Primary | ICD-10-CM

## 2018-01-31 PROCEDURE — G0463 HOSPITAL OUTPT CLINIC VISIT: HCPCS | Performed by: INTERNAL MEDICINE

## 2018-01-31 PROCEDURE — 73140 X-RAY EXAM OF FINGER(S): CPT | Performed by: INTERNAL MEDICINE

## 2018-01-31 PROCEDURE — 99213 OFFICE O/P EST LOW 20 MIN: CPT | Performed by: INTERNAL MEDICINE

## 2018-01-31 NOTE — PROGRESS NOTES
Gilbert Rodas is a 78year old male. Patient presents with:  Finger Pain: Pt c/o left thumb pain   Night Sweats    HPI:   For the past week, he has had occasional sweats at night.   He has had similar night sweats in the past, but night sweats resolv Fatty Acids (FISH OIL OR) Take 1 capsule by mouth 2 (two) times daily. Disp:  Rfl:    OCUVITE Oral Tab Take 1 tablet by mouth daily. Disp:  Rfl:    aspirin 81 MG Oral Tab Take 81 mg by mouth daily.  Disp:  Rfl:    Ibuprofen (ADVIL) 200 MG Oral Cap Take 1 ta labs.    2. Chronic pain of left thumb  X-ray left thumb today. Order sent. Recommend rest, Tylenol or acetaminophen as needed. - XR FINGER(S) (MIN 2 VIEWS), LEFT THUMB (CPT=36140);  Future    The patient indicates understanding of these issues and agree

## 2018-01-31 NOTE — PATIENT INSTRUCTIONS
Await results of left thumb x-ray. Please apply heat or ice and take Tylenol or acetaminophen when needed for thumb pain. Monitor night sweats and call if they persist or if other symptoms such as fever develop. Please schedule a physical in April.

## 2018-02-08 ENCOUNTER — PATIENT OUTREACH (OUTPATIENT)
Dept: CASE MANAGEMENT | Age: 68
End: 2018-02-08

## 2018-02-08 DIAGNOSIS — E78.5 DYSLIPIDEMIA ASSOCIATED WITH TYPE 2 DIABETES MELLITUS (HCC): ICD-10-CM

## 2018-02-08 DIAGNOSIS — E11.69 DYSLIPIDEMIA ASSOCIATED WITH TYPE 2 DIABETES MELLITUS (HCC): ICD-10-CM

## 2018-02-08 DIAGNOSIS — E11.9 TYPE 2 DIABETES MELLITUS WITHOUT COMPLICATION, WITHOUT LONG-TERM CURRENT USE OF INSULIN (HCC): ICD-10-CM

## 2018-02-08 DIAGNOSIS — Z72.0 TOBACCO USE: ICD-10-CM

## 2018-02-08 PROCEDURE — 99490 CHRNC CARE MGMT STAFF 1ST 20: CPT

## 2018-02-08 NOTE — PROGRESS NOTES
Spoke to [facility]. HIPAA Verified.      Patient Active Problem List:     Dyslipidemia associated with type 2 diabetes mellitus ([facility]     Subclinical hypothyroidism     Tobacco use     Glaucoma suspect     Diabetes mellitus, type 2 ([facility]     Age-relat not to worry, has a hx of night sweats. Pt was concerned that he was having a return of abscess on his back. Was examined and back was \"clear\". No recurrance of abscess. Has snack at hs so they are not related to low blood sugar.       · Food pantry- ha medical record review, telephone communication, care plan updates where needed, education, goals and action plan recreation/update. Provided acknowledgment and validation to patient's concerns.    Monthly Minute Total including today: 37    Physical assessm

## 2018-02-08 NOTE — PROGRESS NOTES
[id number]  name and number provided for further follow up. Time Spent This Encounter Total: 5 min medical record review, telephone,  communication.          Monthly Minute Total including today: 5

## 2018-03-06 ENCOUNTER — PATIENT OUTREACH (OUTPATIENT)
Dept: CASE MANAGEMENT | Age: 68
End: 2018-03-06

## 2018-03-06 DIAGNOSIS — E78.5 DYSLIPIDEMIA ASSOCIATED WITH TYPE 2 DIABETES MELLITUS (HCC): ICD-10-CM

## 2018-03-06 DIAGNOSIS — H25.13 AGE-RELATED NUCLEAR CATARACT OF BOTH EYES: ICD-10-CM

## 2018-03-06 DIAGNOSIS — E11.9 TYPE 2 DIABETES MELLITUS WITHOUT COMPLICATION, WITHOUT LONG-TERM CURRENT USE OF INSULIN (HCC): ICD-10-CM

## 2018-03-06 DIAGNOSIS — E11.69 DYSLIPIDEMIA ASSOCIATED WITH TYPE 2 DIABETES MELLITUS (HCC): ICD-10-CM

## 2018-03-06 PROCEDURE — 99490 CHRNC CARE MGMT STAFF 1ST 20: CPT

## 2018-03-06 NOTE — PROGRESS NOTES
[id number]  name and number provided for further follow up. Time Spent This Encounter Total:  6 min medical record review, telephone,  communication.    Monthly Minute Total including today: 6

## 2018-03-07 NOTE — PROGRESS NOTES
[id number]  name and number provided for further follow up. Time Spent This Encounter Total: 1 min medical record review, telephone,  communication.      Monthly Minute Total including today: 10

## 2018-03-07 NOTE — PROGRESS NOTES
Spoke to WillStima Systemsan BegumChristiana for CCM call.     Medical History  Patient Active Problem List:     Dyslipidemia associated with type 2 diabetes mellitus ([facility] [address]     Subclinical hypothyroidism     Tobacco use     Glaucoma suspect     Diabetes mellitus, type 2 would you say is your biggest concerns about your health? Diabetes. • What steps do you think you could take to work on this? Monitor bg levels and be compliant with diet.        • My goal for next month is: (Patient Stated)-  Will continue with diabet

## 2018-03-07 NOTE — PROGRESS NOTES
Rec'd vmm from pt returning call. Time Spent This Encounter Total: 1 min medical record review, telephone,  communication.      Monthly Minute Total including today: 9

## 2018-03-07 NOTE — PROGRESS NOTES
[id number]  name and number provided for further follow up. Time Spent This Encounter Total: 1 min medical record review, telephone,  communication.      Monthly Minute Total including today: 8

## 2018-03-12 ENCOUNTER — PATIENT OUTREACH (OUTPATIENT)
Dept: CASE MANAGEMENT | Age: 68
End: 2018-03-12

## 2018-03-12 ENCOUNTER — TELEPHONE (OUTPATIENT)
Dept: SURGERY | Facility: CLINIC | Age: 68
End: 2018-03-12

## 2018-03-12 NOTE — PROGRESS NOTES
Rec'd vmm left on 3/8/18 from pt. Greeting informed pt that writer would be out of office until 3/12/18./  Pt vmm indicates that he feels that \"the problem I told you about with my back is back. It cannot be allowed to get like it was before.  Please let

## 2018-03-12 NOTE — TELEPHONE ENCOUNTER
LM for patient. Scheduled tomorrow 03/13/2018 at 1145. Call back number given for patient to confirm/reschedule appt.

## 2018-03-12 NOTE — PROGRESS NOTES
Left detailed message on pt answering machine. Informed him that CCM was off as indicated on voicemail and that I had retrieved the message this morning. Informed pt that he has Medicare and there fore does not need a referral to see any other physician.

## 2018-03-12 NOTE — TELEPHONE ENCOUNTER
Patient states he is having problems with his back again. Requesting to be seen asap. No availability until Monday 03/19. Please call. Thank you.

## 2018-03-13 ENCOUNTER — OFFICE VISIT (OUTPATIENT)
Dept: SURGERY | Facility: CLINIC | Age: 68
End: 2018-03-13

## 2018-03-13 ENCOUNTER — TELEPHONE (OUTPATIENT)
Dept: SURGERY | Facility: CLINIC | Age: 68
End: 2018-03-13

## 2018-03-13 VITALS
TEMPERATURE: 99 F | RESPIRATION RATE: 16 BRPM | SYSTOLIC BLOOD PRESSURE: 128 MMHG | DIASTOLIC BLOOD PRESSURE: 88 MMHG | HEART RATE: 68 BPM

## 2018-03-13 DIAGNOSIS — L02.212 BACK ABSCESS: Primary | ICD-10-CM

## 2018-03-13 DIAGNOSIS — L72.3 SEBACEOUS CYST: ICD-10-CM

## 2018-03-13 PROCEDURE — 10060 I&D ABSCESS SIMPLE/SINGLE: CPT | Performed by: SURGERY

## 2018-03-13 PROCEDURE — 99213 OFFICE O/P EST LOW 20 MIN: CPT | Performed by: SURGERY

## 2018-03-13 NOTE — TELEPHONE ENCOUNTER
Contacted patient. Informed him that results will not be available for a few days, once results are available we will contact him with results/recommendations. Patient verbalized understanding and all questions answered.

## 2018-03-13 NOTE — TELEPHONE ENCOUNTER
appt 3-13-18. Took specimen. Have you received the results. Will doctor give a  rx.   Call pt to advise

## 2018-03-15 NOTE — PROGRESS NOTES
Established Patient Follow-up      3/14/2018    Corrina Blood 78year old      HPI  Patient presents with:  Abscess: Pt c/o cyst to mid upper back x 1 wk. Pt c/o chills and itchy sensation to area. Pt denies drainage from area.       Probably same

## 2018-03-16 ENCOUNTER — TELEPHONE (OUTPATIENT)
Dept: SURGERY | Facility: CLINIC | Age: 68
End: 2018-03-16

## 2018-03-16 NOTE — TELEPHONE ENCOUNTER
LM for patient informing him specimen results are still pending/in process. We will contact patient if changes in his plan of care are necessary. CB number given if questions.

## 2018-03-16 NOTE — TELEPHONE ENCOUNTER
Patient checking status on specimen results. Is looking to see if he can take antibiotics. Please call. Thank you.

## 2018-03-19 ENCOUNTER — NURSE ONLY (OUTPATIENT)
Dept: SURGERY | Facility: CLINIC | Age: 68
End: 2018-03-19

## 2018-03-19 ENCOUNTER — TELEPHONE (OUTPATIENT)
Dept: SURGERY | Facility: CLINIC | Age: 68
End: 2018-03-19

## 2018-03-19 DIAGNOSIS — L02.212 BACK ABSCESS: Primary | ICD-10-CM

## 2018-03-19 PROCEDURE — G0463 HOSPITAL OUTPT CLINIC VISIT: HCPCS | Performed by: SURGERY

## 2018-03-19 PROCEDURE — A6266 IMPREG GAUZE NO H20/SAL/YARD: HCPCS | Performed by: SURGERY

## 2018-03-19 NOTE — TELEPHONE ENCOUNTER
Dr. Gracy Ramirez, patient's culture results are available, patient asking if he should be taking antibiotics? Please advise, thanks.

## 2018-03-22 ENCOUNTER — OFFICE VISIT (OUTPATIENT)
Dept: SURGERY | Facility: CLINIC | Age: 68
End: 2018-03-22

## 2018-03-22 DIAGNOSIS — L02.212 BACK ABSCESS: Primary | ICD-10-CM

## 2018-03-22 PROCEDURE — 99024 POSTOP FOLLOW-UP VISIT: CPT | Performed by: SURGERY

## 2018-03-22 PROCEDURE — G0463 HOSPITAL OUTPT CLINIC VISIT: HCPCS | Performed by: SURGERY

## 2018-03-23 NOTE — PROGRESS NOTES
Postoperative Patient Follow-up      3/22/2018    Emiliano Horton 78year old      HPI  Patient presents with:  Abscess: Pt here for check up s/p I&D of back abscess on 3/13/18. Pt had area repacked on 3/19/18 & packing still inplace.   Pt c/o itchy s

## 2018-03-29 ENCOUNTER — NURSE ONLY (OUTPATIENT)
Dept: SURGERY | Facility: CLINIC | Age: 68
End: 2018-03-29

## 2018-03-29 DIAGNOSIS — L02.212 BACK ABSCESS: Primary | ICD-10-CM

## 2018-03-29 PROCEDURE — A6216 NON-STERILE GAUZE<=16 SQ IN: HCPCS | Performed by: SURGERY

## 2018-03-29 PROCEDURE — G0463 HOSPITAL OUTPT CLINIC VISIT: HCPCS | Performed by: SURGERY

## 2018-04-06 ENCOUNTER — PATIENT OUTREACH (OUTPATIENT)
Dept: CASE MANAGEMENT | Age: 68
End: 2018-04-06

## 2018-04-06 ENCOUNTER — MED REC SCAN ONLY (OUTPATIENT)
Dept: INTERNAL MEDICINE CLINIC | Facility: CLINIC | Age: 68
End: 2018-04-06

## 2018-04-06 DIAGNOSIS — E78.5 DYSLIPIDEMIA ASSOCIATED WITH TYPE 2 DIABETES MELLITUS (HCC): ICD-10-CM

## 2018-04-06 DIAGNOSIS — E11.9 TYPE 2 DIABETES MELLITUS WITHOUT COMPLICATION, WITHOUT LONG-TERM CURRENT USE OF INSULIN (HCC): ICD-10-CM

## 2018-04-06 DIAGNOSIS — E11.69 DYSLIPIDEMIA ASSOCIATED WITH TYPE 2 DIABETES MELLITUS (HCC): ICD-10-CM

## 2018-04-06 PROCEDURE — 99490 CHRNC CARE MGMT STAFF 1ST 20: CPT

## 2018-04-06 NOTE — PROGRESS NOTES
[id number]  name and number provided for further follow up. Time Spent This Encounter Total: 6 min medical record review, telephone,  communication.        Monthly Minute Total including today: 6

## 2018-04-09 ENCOUNTER — TELEPHONE (OUTPATIENT)
Dept: INTERNAL MEDICINE CLINIC | Facility: CLINIC | Age: 68
End: 2018-04-09

## 2018-04-09 DIAGNOSIS — Z12.5 ENCOUNTER FOR SCREENING FOR MALIGNANT NEOPLASM OF PROSTATE: ICD-10-CM

## 2018-04-09 DIAGNOSIS — E11.69 DYSLIPIDEMIA ASSOCIATED WITH TYPE 2 DIABETES MELLITUS (HCC): ICD-10-CM

## 2018-04-09 DIAGNOSIS — E11.9 TYPE 2 DIABETES MELLITUS WITHOUT COMPLICATION, WITHOUT LONG-TERM CURRENT USE OF INSULIN (HCC): Primary | ICD-10-CM

## 2018-04-09 DIAGNOSIS — E03.8 SUBCLINICAL HYPOTHYROIDISM: ICD-10-CM

## 2018-04-09 DIAGNOSIS — E78.5 DYSLIPIDEMIA ASSOCIATED WITH TYPE 2 DIABETES MELLITUS (HCC): ICD-10-CM

## 2018-04-09 NOTE — TELEPHONE ENCOUNTER
Okay to order CMP, CBC, glycohemoglobin, lipid profile, screening PSA, TSH with reflex T4 and urine microalbumin.

## 2018-04-10 NOTE — TELEPHONE ENCOUNTER
Advised patient of Dr. Cookie Waggoner note and advised to fast 12 hours for labs. Patient verbalized understanding. Lab order already generated.

## 2018-04-11 ENCOUNTER — LAB ENCOUNTER (OUTPATIENT)
Dept: LAB | Age: 68
End: 2018-04-11
Attending: INTERNAL MEDICINE
Payer: MEDICARE

## 2018-04-11 DIAGNOSIS — E78.5 DYSLIPIDEMIA ASSOCIATED WITH TYPE 2 DIABETES MELLITUS (HCC): ICD-10-CM

## 2018-04-11 DIAGNOSIS — E11.9 TYPE 2 DIABETES MELLITUS WITHOUT COMPLICATION, WITHOUT LONG-TERM CURRENT USE OF INSULIN (HCC): ICD-10-CM

## 2018-04-11 DIAGNOSIS — Z12.5 ENCOUNTER FOR SCREENING FOR MALIGNANT NEOPLASM OF PROSTATE: ICD-10-CM

## 2018-04-11 DIAGNOSIS — E11.69 DYSLIPIDEMIA ASSOCIATED WITH TYPE 2 DIABETES MELLITUS (HCC): ICD-10-CM

## 2018-04-11 DIAGNOSIS — E03.8 SUBCLINICAL HYPOTHYROIDISM: ICD-10-CM

## 2018-04-11 PROCEDURE — 84439 ASSAY OF FREE THYROXINE: CPT

## 2018-04-11 PROCEDURE — 84443 ASSAY THYROID STIM HORMONE: CPT

## 2018-04-11 PROCEDURE — 80053 COMPREHEN METABOLIC PANEL: CPT

## 2018-04-11 PROCEDURE — 80061 LIPID PANEL: CPT

## 2018-04-11 PROCEDURE — 82570 ASSAY OF URINE CREATININE: CPT

## 2018-04-11 PROCEDURE — 83036 HEMOGLOBIN GLYCOSYLATED A1C: CPT

## 2018-04-11 PROCEDURE — 85025 COMPLETE CBC W/AUTO DIFF WBC: CPT

## 2018-04-11 PROCEDURE — 36415 COLL VENOUS BLD VENIPUNCTURE: CPT

## 2018-04-11 PROCEDURE — 82043 UR ALBUMIN QUANTITATIVE: CPT

## 2018-04-11 NOTE — PROGRESS NOTES
Spoke to [place], [place] verified for CCM call.     Medical History  Patient Active Problem List:     Dyslipidemia associated with type 2 diabetes mellitus ([facility] [address]     Subclinical hypothyroidism     Tobacco use     Glaucoma suspect     Diabetes mellitus, type 2 Dental Society. Sharmin he rec'd the paperwork and he didn't want to provide details of his financial status to them so he is not interested in pursuing assistance from them.   Sharmin he has [facility] and did receive a dental appt for xray and luis felipe Monthly Minute Total including today: 32       Physical assessment, complete health history, and need for CCM established by Edgard Maya MD.

## 2018-04-11 NOTE — PROGRESS NOTES
Pt noted to have labs completed this morning in preparation for upcoming PCP visit on 4/18/18. LMTCB. ([id number]).  name and number provided for further follow up.      Time Spent This Encounter Total: 2 min medical record review, telephone,  comm

## 2018-04-18 ENCOUNTER — OFFICE VISIT (OUTPATIENT)
Dept: INTERNAL MEDICINE CLINIC | Facility: CLINIC | Age: 68
End: 2018-04-18

## 2018-04-18 VITALS
HEART RATE: 63 BPM | BODY MASS INDEX: 28.26 KG/M2 | WEIGHT: 208.63 LBS | SYSTOLIC BLOOD PRESSURE: 134 MMHG | HEIGHT: 72 IN | DIASTOLIC BLOOD PRESSURE: 76 MMHG

## 2018-04-18 DIAGNOSIS — Z72.0 TOBACCO USE: ICD-10-CM

## 2018-04-18 DIAGNOSIS — E11.9 TYPE 2 DIABETES MELLITUS WITHOUT COMPLICATION, WITHOUT LONG-TERM CURRENT USE OF INSULIN (HCC): Primary | ICD-10-CM

## 2018-04-18 DIAGNOSIS — E03.8 SUBCLINICAL HYPOTHYROIDISM: ICD-10-CM

## 2018-04-18 DIAGNOSIS — E78.5 DYSLIPIDEMIA ASSOCIATED WITH TYPE 2 DIABETES MELLITUS (HCC): ICD-10-CM

## 2018-04-18 DIAGNOSIS — E11.69 DYSLIPIDEMIA ASSOCIATED WITH TYPE 2 DIABETES MELLITUS (HCC): ICD-10-CM

## 2018-04-18 PROCEDURE — 90715 TDAP VACCINE 7 YRS/> IM: CPT | Performed by: INTERNAL MEDICINE

## 2018-04-18 PROCEDURE — 90471 IMMUNIZATION ADMIN: CPT | Performed by: INTERNAL MEDICINE

## 2018-04-18 PROCEDURE — 99214 OFFICE O/P EST MOD 30 MIN: CPT | Performed by: INTERNAL MEDICINE

## 2018-04-18 PROCEDURE — G0463 HOSPITAL OUTPT CLINIC VISIT: HCPCS | Performed by: INTERNAL MEDICINE

## 2018-04-18 NOTE — H&P
Eden Hays is a 78year old male who presents this afternoon for follow-up of type 2 diabetes and dyslipidemia.   He is also due for his annual exam.  HPI:   He underwent I&D of an abscess on his upper back last month, and still has a dressing in Ciprofloxacin-Hydrocortisone 0.2-1 % Otic Suspension 2 (two) times daily. Disp:  Rfl:    Blood Glucose Monitoring Suppl (SHRUTI CONTOUR NEXT EZ) w/Device Does not apply Kit 1 Device by Other route 2 (two) times daily. Use as directed.  Disp: 1 kit Rfl: 0 • Glaucoma Neg       Social History:  Smoking status: Current Every Day Smoker                                                   Packs/day: 0.50      Years: 26.00        Types: Cigarettes  Smokeless tobacco: Never Used                      Alcohol use: N without complication, without long-term current use of insulin ([address] [address]  Well-controlled. Tdap vaccine today. Reinforced annual influenza vaccine. Also recommended he obtain Shingrix at a local pharmacy. Continue current medications.   Tobacco cessation di

## 2018-04-18 NOTE — PATIENT INSTRUCTIONS
You received a Tdap vaccine today, good for 10 years. Remember to get a flu shot every fall. Please get a Shingrix vaccine to protect against shingles at a local pharmacy. Continue current medications. Please try to stop smoking.   Continue to eat healt

## 2018-04-24 ENCOUNTER — OFFICE VISIT (OUTPATIENT)
Dept: SURGERY | Facility: CLINIC | Age: 68
End: 2018-04-24

## 2018-04-24 DIAGNOSIS — L72.3 SEBACEOUS CYST: Primary | ICD-10-CM

## 2018-04-24 PROCEDURE — 99214 OFFICE O/P EST MOD 30 MIN: CPT | Performed by: SURGERY

## 2018-04-24 PROCEDURE — G0463 HOSPITAL OUTPT CLINIC VISIT: HCPCS | Performed by: SURGERY

## 2018-04-24 NOTE — H&P
History and Physical      Shanna Kumar is a 78year old male. HPI   Patient presents with:  Post-Op: S/P I & D of back abscess 3/13/18. Site has no dressing at this time. Area shows no redness, no drainage.   There is a raised area above the Rfl:    Ciprofloxacin-Hydrocortisone 0.2-1 % Otic Suspension 2 (two) times daily. Disp:  Rfl:    Blood Glucose Monitoring Suppl (SHRUTI CONTOUR NEXT EZ) w/Device Does not apply Kit 1 Device by Other route 2 (two) times daily. Use as directed.  Disp: 1 kit Rf normocephalic  Nose/Mouth/Throat: nose and throat are clear palate is intact mucous membranes are moist no oral lesions are noted  Neck/Thyroid: neck is supple without adenopathy  Respiratory: normal to inspection lungs are clear to auscultation bilaterall

## 2018-05-02 ENCOUNTER — PATIENT OUTREACH (OUTPATIENT)
Dept: CASE MANAGEMENT | Age: 68
End: 2018-05-02

## 2018-05-02 DIAGNOSIS — E11.69 DYSLIPIDEMIA ASSOCIATED WITH TYPE 2 DIABETES MELLITUS (HCC): ICD-10-CM

## 2018-05-02 DIAGNOSIS — E78.5 DYSLIPIDEMIA ASSOCIATED WITH TYPE 2 DIABETES MELLITUS (HCC): ICD-10-CM

## 2018-05-02 DIAGNOSIS — E11.9 TYPE 2 DIABETES MELLITUS WITHOUT COMPLICATION, WITHOUT LONG-TERM CURRENT USE OF INSULIN (HCC): ICD-10-CM

## 2018-05-02 PROCEDURE — 99490 CHRNC CARE MGMT STAFF 1ST 20: CPT

## 2018-05-02 NOTE — PROGRESS NOTES
Spoke to [place], [place] verified for CCM call.     Medical History  Patient Active Problem List:     Dyslipidemia associated with type 2 diabetes mellitus ([facility] [address]     Subclinical hypothyroidism     Tobacco use     Glaucoma suspect     Diabetes mellitus, type 2 outreach: Pt would like resolution of cyst on back. • Patient reported progress toward goal: had appt with Dr. Samuel Ruffin, plan is for cyst to be removed in a few week.  .        • Update to previous barriers: none    • Patient Reported

## 2018-05-18 ENCOUNTER — LAB REQUISITION (OUTPATIENT)
Dept: LAB | Facility: HOSPITAL | Age: 68
End: 2018-05-18
Payer: MEDICARE

## 2018-05-18 DIAGNOSIS — Z01.89 ENCOUNTER FOR OTHER SPECIFIED SPECIAL EXAMINATIONS: ICD-10-CM

## 2018-05-18 PROCEDURE — 88304 TISSUE EXAM BY PATHOLOGIST: CPT | Performed by: SURGERY

## 2018-05-22 ENCOUNTER — TELEPHONE (OUTPATIENT)
Dept: SURGERY | Facility: CLINIC | Age: 68
End: 2018-05-22

## 2018-05-22 NOTE — TELEPHONE ENCOUNTER
Pt states that he is RT RNs call to get his test results. Pt. States that he will give the RN consent to leave him a detailed vm of his test results. I tried to reach RN, but not avail.

## 2018-05-23 ENCOUNTER — NURSE ONLY (OUTPATIENT)
Dept: SURGERY | Facility: CLINIC | Age: 68
End: 2018-05-23

## 2018-05-23 ENCOUNTER — PATIENT MESSAGE (OUTPATIENT)
Dept: OPTOMETRY | Facility: CLINIC | Age: 68
End: 2018-05-23

## 2018-05-23 DIAGNOSIS — Z98.890 H/O REMOVAL OF CYST: Primary | ICD-10-CM

## 2018-05-23 PROCEDURE — A6216 NON-STERILE GAUZE<=16 SQ IN: HCPCS | Performed by: SURGERY

## 2018-05-23 PROCEDURE — G0463 HOSPITAL OUTPT CLINIC VISIT: HCPCS

## 2018-05-24 NOTE — TELEPHONE ENCOUNTER
From: Leona Mccall  To: Jelani Nicholas  Sent: 5/23/2018 8:43 PM CDT  Subject: Other    5.23.18    Thank you for the exam appointment Shauna 15.     Jozef Ferris

## 2018-05-30 ENCOUNTER — NURSE ONLY (OUTPATIENT)
Dept: SURGERY | Facility: CLINIC | Age: 68
End: 2018-05-30

## 2018-05-30 DIAGNOSIS — Z98.890 H/O REMOVAL OF CYST: Primary | ICD-10-CM

## 2018-05-30 PROCEDURE — G0463 HOSPITAL OUTPT CLINIC VISIT: HCPCS

## 2018-05-30 PROCEDURE — A6216 NON-STERILE GAUZE<=16 SQ IN: HCPCS | Performed by: SURGERY

## 2018-06-05 ENCOUNTER — TELEPHONE (OUTPATIENT)
Dept: INTERNAL MEDICINE CLINIC | Facility: CLINIC | Age: 68
End: 2018-06-05

## 2018-06-05 ENCOUNTER — PATIENT OUTREACH (OUTPATIENT)
Dept: CASE MANAGEMENT | Age: 68
End: 2018-06-05

## 2018-06-05 DIAGNOSIS — E11.69 DYSLIPIDEMIA ASSOCIATED WITH TYPE 2 DIABETES MELLITUS (HCC): ICD-10-CM

## 2018-06-05 DIAGNOSIS — E78.5 DYSLIPIDEMIA ASSOCIATED WITH TYPE 2 DIABETES MELLITUS (HCC): ICD-10-CM

## 2018-06-05 DIAGNOSIS — E11.9 TYPE 2 DIABETES MELLITUS WITHOUT COMPLICATION, WITHOUT LONG-TERM CURRENT USE OF INSULIN (HCC): ICD-10-CM

## 2018-06-05 NOTE — TELEPHONE ENCOUNTER
vmm left for pt providing him with name of physicians as well as their contact number for [facility].

## 2018-06-05 NOTE — PROGRESS NOTES
Rec'd vmm from pt returning call. Requests call back. Time Spent This Encounter Total: 1 min medical record review, telephone,  communication.        Monthly Minute Total including today: 6

## 2018-06-05 NOTE — TELEPHONE ENCOUNTER
Pt is asking for recommendation for you for orthopedic drAnai In case he wishes to follow up for his right thumb pain.

## 2018-06-05 NOTE — PROGRESS NOTES
Chart reviewed. [facility]. ([id number]).  name and number provided for further follow up. Time Spent This Encounter Total: 5 min medical record review, telephone,  communication.        Monthly Minute Total including today: 5  '

## 2018-06-07 ENCOUNTER — PATIENT MESSAGE (OUTPATIENT)
Dept: INTERNAL MEDICINE CLINIC | Facility: CLINIC | Age: 68
End: 2018-06-07

## 2018-06-07 ENCOUNTER — OFFICE VISIT (OUTPATIENT)
Dept: SURGERY | Facility: CLINIC | Age: 68
End: 2018-06-07

## 2018-06-07 VITALS — WEIGHT: 208 LBS | BODY MASS INDEX: 28 KG/M2

## 2018-06-07 DIAGNOSIS — L72.3 SEBACEOUS CYST: Primary | ICD-10-CM

## 2018-06-07 DIAGNOSIS — E11.65 TYPE 2 DIABETES MELLITUS WITH HYPERGLYCEMIA, WITHOUT LONG-TERM CURRENT USE OF INSULIN (HCC): ICD-10-CM

## 2018-06-07 PROCEDURE — 99024 POSTOP FOLLOW-UP VISIT: CPT | Performed by: SURGERY

## 2018-06-07 PROCEDURE — G0463 HOSPITAL OUTPT CLINIC VISIT: HCPCS | Performed by: SURGERY

## 2018-06-08 ENCOUNTER — TELEPHONE (OUTPATIENT)
Dept: OTHER | Age: 68
End: 2018-06-08

## 2018-06-08 ENCOUNTER — TELEPHONE (OUTPATIENT)
Dept: INTERNAL MEDICINE CLINIC | Facility: CLINIC | Age: 68
End: 2018-06-08

## 2018-06-08 NOTE — TELEPHONE ENCOUNTER
Pt is asking for Dr. Jas Leon opinion if he should see a rheumatologist for his right thumb pain instead of an orthopedic physician.

## 2018-06-08 NOTE — TELEPHONE ENCOUNTER
Spoke with patient who reports he broke his Microlet tube for the lancet and the pharmacy is able to provide him with one, but they need an rx from his PCP. This nurse was not able to find the name of the device in EPIC.  This nurse called in the rx for the

## 2018-06-08 NOTE — TELEPHONE ENCOUNTER
Diabetes Medications  Protocol Criteria:  · Appointment scheduled in the past 6 months or the next 3 months  · A1C < 7.5 in the past 6 months  · Creatinine in the past 12 months  · Creatinine result < 1.5   Recent Outpatient Visits            Yesterday Seb

## 2018-06-08 NOTE — PROGRESS NOTES
Postoperative Patient Follow-up      6/7/2018    Shanna Kumar 78year old      HPI  Patient presents with:  Post-Op: S/P Excisional biopsy of left midback skin cyst with layered closure 5/18/2018.   Patient states he feels good, there is no drainage

## 2018-06-08 NOTE — TELEPHONE ENCOUNTER
From: Leona Mccall  To: Suri Ching MD  Sent: 6/7/2018 1:20 PM CDT  Subject: Prescription Question    6.7.18    Dr. James Daniel    Can you possibly send an Rx to the [place] on file for the following? It just broke.     Microlet2 - Microlet lancet t

## 2018-06-15 ENCOUNTER — PATIENT MESSAGE (OUTPATIENT)
Dept: OPTOMETRY | Facility: CLINIC | Age: 68
End: 2018-06-15

## 2018-06-15 ENCOUNTER — OFFICE VISIT (OUTPATIENT)
Dept: OPTOMETRY | Facility: CLINIC | Age: 68
End: 2018-06-15

## 2018-06-15 ENCOUNTER — PATIENT MESSAGE (OUTPATIENT)
Dept: INTERNAL MEDICINE CLINIC | Facility: CLINIC | Age: 68
End: 2018-06-15

## 2018-06-15 DIAGNOSIS — H25.13 AGE-RELATED NUCLEAR CATARACT OF BOTH EYES: ICD-10-CM

## 2018-06-15 DIAGNOSIS — H52.4 HYPEROPIA WITH ASTIGMATISM AND PRESBYOPIA, BILATERAL: ICD-10-CM

## 2018-06-15 DIAGNOSIS — H52.203 HYPEROPIA WITH ASTIGMATISM AND PRESBYOPIA, BILATERAL: ICD-10-CM

## 2018-06-15 DIAGNOSIS — H52.03 HYPEROPIA WITH ASTIGMATISM AND PRESBYOPIA, BILATERAL: ICD-10-CM

## 2018-06-15 DIAGNOSIS — E11.9 TYPE 2 DIABETES MELLITUS WITHOUT COMPLICATION, WITHOUT LONG-TERM CURRENT USE OF INSULIN (HCC): Primary | ICD-10-CM

## 2018-06-15 PROCEDURE — 92015 DETERMINE REFRACTIVE STATE: CPT | Performed by: OPTOMETRIST

## 2018-06-15 PROCEDURE — 92014 COMPRE OPH EXAM EST PT 1/>: CPT | Performed by: OPTOMETRIST

## 2018-06-15 NOTE — PROGRESS NOTES
Taylor Austin is a 78year old male. HPI:     HPI     Diabetic Eye Exam   Diabetes characteristics include controlled with diet and taking oral medications. Duration of 2 years. Number of years on pills 2. Number of years on insulin 0.   Does P Rfl: 11   FLUTICASONE PROPIONATE 50 MCG/ACT Nasal Suspension USE 1 TO 2 SPRAYS IN EACH NOSTRIL DAILY Disp: 1 Inhaler Rfl: 3   METFORMIN  MG Oral Tab TAKE 1 TABLET(500 MG) BY MOUTH TWICE DAILY WITH MEALS Disp: 180 tablet Rfl: 3   ATORVASTATIN 20 MG O Additional Tests     [place]       Right Left     Normal Normal            Slit Lamp and Fundus Exam     External Exam       Right Left    External Normal Normal          Slit Lamp Exam       Right Left    Lids/Lashes Normal Normal    Conjunctiva/Scler requested or ordered in this encounter     Follow up instructions:  Return in about 1 year (around 6/15/2019) for Diabetic Eye exam.    6/15/2018  Scribed by: Shoshana Brown

## 2018-06-16 NOTE — TELEPHONE ENCOUNTER
From: Sho Mcdonald  To: Yaquelin Desir MD  Sent: 6/15/2018 3:33 PM CDT  Subject: Other    6.15.18    Dr Drake Christian exam - [facility] - no bad stuff  Thanks again    Junie Boudreaux

## 2018-06-18 NOTE — TELEPHONE ENCOUNTER
From: Elizabeth Munson  To: Jelani Melchor  Sent: 6/15/2018 3:32 PM CDT  Subject: Other    6.15.18    Dr Anabela Randle    Thank you for the complete exam and new Rx/glasses information    Rocky Shelby

## 2018-06-25 ENCOUNTER — PATIENT MESSAGE (OUTPATIENT)
Dept: ORTHOPEDICS CLINIC | Facility: CLINIC | Age: 68
End: 2018-06-25

## 2018-06-26 ENCOUNTER — TELEPHONE (OUTPATIENT)
Dept: ORTHOPEDICS CLINIC | Facility: CLINIC | Age: 68
End: 2018-06-26

## 2018-06-27 NOTE — TELEPHONE ENCOUNTER
----- Message from [facility]. Chiqui Shantal sent at 6/25/2018  4:33 PM CDT -----  Regarding: Non-Urgent Medical Question  Contact: [phone]  6.25.18    Dr Narcisa Clark    I completed the History questionaire as requested.   Do I also complete an eCheckIn, or is all

## 2018-06-30 PROCEDURE — 99490 CHRNC CARE MGMT STAFF 1ST 20: CPT

## 2018-07-01 ENCOUNTER — PATIENT MESSAGE (OUTPATIENT)
Dept: INTERNAL MEDICINE CLINIC | Facility: CLINIC | Age: 68
End: 2018-07-01

## 2018-07-02 NOTE — TELEPHONE ENCOUNTER
From: Che Isidro  To: Carrington Brink MD  Sent: 7/1/2018 4:38 AM CDT  Subject: Other    7. 1.18    Dr Jarrell Isidro    Can you please send a letter ASAP to the address given - stating I have  \"Type 2 Diabetes Mellitus\" and \"Hypertension\" - please stat

## 2018-07-03 ENCOUNTER — PATIENT MESSAGE (OUTPATIENT)
Dept: INTERNAL MEDICINE CLINIC | Facility: CLINIC | Age: 68
End: 2018-07-03

## 2018-07-03 DIAGNOSIS — E11.65 TYPE 2 DIABETES MELLITUS WITH HYPERGLYCEMIA, WITHOUT LONG-TERM CURRENT USE OF INSULIN (HCC): ICD-10-CM

## 2018-07-03 NOTE — TELEPHONE ENCOUNTER
From: Corby Cantrell  To: Judi Stephens MD  Sent: 7/3/2018 5:15 PM CDT  Subject: Other    7. 3.18    Dr Blaze Barber    Thank you    Chadwick Piper Follow-up with Dr. Nuñez at [facility] for Cardiac Catheterization.

## 2018-07-05 NOTE — TELEPHONE ENCOUNTER
Patient requesting Rx for test strips--to test once daily, not BID. Sent for one year supply per DM supplies protocol.     LOV 4/18/18 with MN      Signed Prescriptions Disp Refills    Glucose Blood (CONTOUR NEXT TEST) In Vitro Strip 100 strip 3      Sig: T

## 2018-07-11 ENCOUNTER — TELEPHONE (OUTPATIENT)
Dept: INTERNAL MEDICINE CLINIC | Facility: CLINIC | Age: 68
End: 2018-07-11

## 2018-07-11 ENCOUNTER — PATIENT OUTREACH (OUTPATIENT)
Dept: CASE MANAGEMENT | Age: 68
End: 2018-07-11

## 2018-07-11 DIAGNOSIS — E11.69 DYSLIPIDEMIA ASSOCIATED WITH TYPE 2 DIABETES MELLITUS (HCC): ICD-10-CM

## 2018-07-11 DIAGNOSIS — E78.5 DYSLIPIDEMIA ASSOCIATED WITH TYPE 2 DIABETES MELLITUS (HCC): ICD-10-CM

## 2018-07-11 DIAGNOSIS — E11.9 TYPE 2 DIABETES MELLITUS WITHOUT COMPLICATION, WITHOUT LONG-TERM CURRENT USE OF INSULIN (HCC): ICD-10-CM

## 2018-07-11 NOTE — TELEPHONE ENCOUNTER
Pt requests Dr. Mat Shepard input on this issue:  He was using a cane but at LOV was recommended he stop using for ambulation. Pt is asking if he should receive/would benefit from a left knee brace for \"extra support in case I am out doing something\".   Pt re

## 2018-07-11 NOTE — PROGRESS NOTES
Chart reviewed. [facility]. ([id number]).  name and number provided for further follow up. Time Spent This Encounter Total: 5 min medical record review, telephone,  communication.        Monthly Minute Total including today: 5

## 2018-07-11 NOTE — TELEPHONE ENCOUNTER
If he wants to schedule an office visit, I would be happy to meet with him to discuss a knee brace but I am not sure he has a medical indication for such a brace

## 2018-07-11 NOTE — PROGRESS NOTES
Spoke to Christiana Faria for CCM call.     Medical History  Patient Active Problem List:     Dyslipidemia associated with type 2 diabetes mellitus ([facility] [address]     Subclinical hypothyroidism     Tobacco use     Glaucoma suspect     Hyperopia with astigmatis pain.    · Gait- pt reports that the last time he saw pcp he was using cane for additional support. PCP inquired why he was still using it.  Pt is now asking instead of a cane if he would be appropriate for a left knee brace \"for extra support, in case I

## 2018-07-31 PROCEDURE — 99490 CHRNC CARE MGMT STAFF 1ST 20: CPT

## 2018-08-07 ENCOUNTER — PATIENT OUTREACH (OUTPATIENT)
Dept: CASE MANAGEMENT | Age: 68
End: 2018-08-07

## 2018-08-07 DIAGNOSIS — E11.69 DYSLIPIDEMIA ASSOCIATED WITH TYPE 2 DIABETES MELLITUS (HCC): ICD-10-CM

## 2018-08-07 DIAGNOSIS — E78.5 DYSLIPIDEMIA ASSOCIATED WITH TYPE 2 DIABETES MELLITUS (HCC): ICD-10-CM

## 2018-08-07 DIAGNOSIS — E11.9 TYPE 2 DIABETES MELLITUS WITHOUT COMPLICATION, WITHOUT LONG-TERM CURRENT USE OF INSULIN (HCC): ICD-10-CM

## 2018-08-07 NOTE — PROGRESS NOTES
Chart reviewed. [facility]. ([id number]).  name and number provided for further follow up. Pt needs to schedule medicare physician with PCP. Time Spent This Encounter Total: 8 min medical record review, telephone,  communication.          Monthly

## 2018-08-13 RX ORDER — FLUTICASONE PROPIONATE 50 MCG
SPRAY, SUSPENSION (ML) NASAL
Qty: 1 BOTTLE | Refills: 5 | Status: SHIPPED | OUTPATIENT
Start: 2018-08-13 | End: 2019-03-08

## 2018-08-15 ENCOUNTER — TELEPHONE (OUTPATIENT)
Dept: INTERNAL MEDICINE CLINIC | Facility: CLINIC | Age: 68
End: 2018-08-15

## 2018-08-15 DIAGNOSIS — Z00.00 PHYSICAL EXAM: Primary | ICD-10-CM

## 2018-08-15 NOTE — TELEPHONE ENCOUNTER
Scheduled pt for his Medicare physical with you in October. He is asking about pre labs. Please advise what all you would like him to have done prior to appt.

## 2018-08-15 NOTE — PROGRESS NOTES
[id number]  name and number provided for further follow up. Time Spent This Encounter Total: 1 min medical record review, telephone,  communication. care plan updates where needed, and education.  Provided acknowledgment and Chiqui Part

## 2018-08-16 NOTE — TELEPHONE ENCOUNTER
Pt called back, he was informed that hgb A1C is the only lab needed, order has been placed. Pt was advised that he can have blood drawn at his convenience as it is not a fasting test. He voices understanding and agrees with plan.

## 2018-08-16 NOTE — TELEPHONE ENCOUNTER
LMTCB.  When pt calls back pls inform him lab order for A1c has been generated and in the system.   Per MTNs message below

## 2018-08-31 PROCEDURE — 99490 CHRNC CARE MGMT STAFF 1ST 20: CPT

## 2018-09-12 ENCOUNTER — PATIENT OUTREACH (OUTPATIENT)
Dept: CASE MANAGEMENT | Age: 68
End: 2018-09-12

## 2018-09-12 DIAGNOSIS — E11.9 TYPE 2 DIABETES MELLITUS WITHOUT COMPLICATION, WITHOUT LONG-TERM CURRENT USE OF INSULIN (HCC): ICD-10-CM

## 2018-09-12 DIAGNOSIS — E78.5 DYSLIPIDEMIA ASSOCIATED WITH TYPE 2 DIABETES MELLITUS (HCC): ICD-10-CM

## 2018-09-12 DIAGNOSIS — E11.69 DYSLIPIDEMIA ASSOCIATED WITH TYPE 2 DIABETES MELLITUS (HCC): ICD-10-CM

## 2018-09-12 NOTE — PROGRESS NOTES
Chart reviewed. [facility]. ([id number]).  name and number provided for further follow up. Time Spent This Encounter Total: 5 min medical record review, telephone,  communication.          Monthly Minute Total including today: 5

## 2018-09-12 NOTE — PROGRESS NOTES
Spoke to [place], [place] verified for CCM call.     Medical History  Patient Active Problem List:     Dyslipidemia associated with type 2 diabetes mellitus ([facility] [address]     Subclinical hypothyroidism     Tobacco use     Glaucoma suspect     Hyperopia with astigmatis reported progress toward goal: pt waiting until he can afford them.         • Update to previous barriers: none    • Patient Reported New Barriers And Concerns:  none                   - Plan for overcoming all barriers: n/a            Patient agrees to [employer]

## 2018-09-19 ENCOUNTER — APPOINTMENT (OUTPATIENT)
Dept: LAB | Age: 68
End: 2018-09-19
Attending: INTERNAL MEDICINE
Payer: MEDICARE

## 2018-09-19 DIAGNOSIS — Z00.00 PHYSICAL EXAM: ICD-10-CM

## 2018-09-19 LAB — HBA1C MFR BLD: 5.7 % (ref 4–6)

## 2018-09-19 PROCEDURE — 36415 COLL VENOUS BLD VENIPUNCTURE: CPT

## 2018-09-19 PROCEDURE — 83036 HEMOGLOBIN GLYCOSYLATED A1C: CPT

## 2018-09-30 PROCEDURE — 99490 CHRNC CARE MGMT STAFF 1ST 20: CPT

## 2018-10-10 RX ORDER — ATORVASTATIN CALCIUM 20 MG/1
TABLET, FILM COATED ORAL
Qty: 90 TABLET | Refills: 1 | Status: SHIPPED | OUTPATIENT
Start: 2018-10-10 | End: 2018-12-27

## 2018-10-19 ENCOUNTER — OFFICE VISIT (OUTPATIENT)
Dept: INTERNAL MEDICINE CLINIC | Facility: CLINIC | Age: 68
End: 2018-10-19
Payer: MEDICARE

## 2018-10-19 VITALS
BODY MASS INDEX: 26.07 KG/M2 | WEIGHT: 201 LBS | SYSTOLIC BLOOD PRESSURE: 128 MMHG | HEART RATE: 71 BPM | DIASTOLIC BLOOD PRESSURE: 74 MMHG | HEIGHT: 73.75 IN

## 2018-10-19 DIAGNOSIS — Z00.00 ANNUAL PHYSICAL EXAM: Primary | ICD-10-CM

## 2018-10-19 DIAGNOSIS — E03.8 SUBCLINICAL HYPOTHYROIDISM: ICD-10-CM

## 2018-10-19 DIAGNOSIS — Z00.00 ENCOUNTER FOR ANNUAL HEALTH EXAMINATION: ICD-10-CM

## 2018-10-19 DIAGNOSIS — E11.69 DYSLIPIDEMIA ASSOCIATED WITH TYPE 2 DIABETES MELLITUS (HCC): ICD-10-CM

## 2018-10-19 DIAGNOSIS — E78.5 DYSLIPIDEMIA ASSOCIATED WITH TYPE 2 DIABETES MELLITUS (HCC): ICD-10-CM

## 2018-10-19 DIAGNOSIS — E11.9 TYPE 2 DIABETES MELLITUS WITHOUT COMPLICATION, WITHOUT LONG-TERM CURRENT USE OF INSULIN (HCC): ICD-10-CM

## 2018-10-19 DIAGNOSIS — Z72.0 TOBACCO USE: ICD-10-CM

## 2018-10-19 PROCEDURE — G0439 PPPS, SUBSEQ VISIT: HCPCS | Performed by: INTERNAL MEDICINE

## 2018-10-19 NOTE — PROGRESS NOTES
HPI:   Madonna Szymanski is a 78year old male who presents this afternoon for a Medicare Subsequent Annual Wellness visit (Pt already had Initial Annual Wellness). He feels well today and has no specific issues for discussion.   Accu-Cheks performed Smoking status: Current Every Day Smoker        Packs/day: 0.50        Years: 26.00        Pack years: 13        Types: Cigarettes      Smokeless tobacco: Never Used     This is a tobacco user, and I will give tobacco cessation counseling today.  (update Vi Visit) with Dion Jacinto MD:  METFORMIN  MG Oral Tab TAKE 1 TABLET(500 MG) BY MOUTH TWICE DAILY WITH MEALS   ATORVASTATIN 20 MG Oral Tab TAKE 1 TABLET(20 MG) BY MOUTH EVERY EVENING   FLUTICASONE PROPIONATE 50 MCG/ACT Nasal Suspension INSTILL 1-2 REVIEW OF SYSTEMS:   GENERAL: No fever  LUNGS: No cough wheezing or shortness of breath  CARDIAC: No lightheadedness palpitations or chest pain  GI: No anorexia heartburn dysphagia nausea vomiting abdominal pain diarrhea constipation or rectal bleeding me they think I may have hearing loss:   No                 Visual Acuity  Right Eye Visual Acuity: Corrected Right Eye Chart Acuity: 20/40   Left Eye Visual Acuity: Corrected Left Eye Chart Acuity: 20/25   Both Eyes Visual Acuity: Corrected Both Eyes Chart and labs    Type 2 diabetes mellitus without complication, without long-term current use of insulin (HCC)  Well-controlled    Subclinical hypothyroidism  Mild.   Will recheck with next labs    Dyslipidemia associated with type 2 diabetes mellitus (HCC)  Con results found for: FOB No flowsheet data found.     Glaucoma Screening      Ophthalmology Visit Annually: Diabetics, FHx Glaucoma, AA>50, > 65 Data entered on: 6/15/2018   Last Dilated Eye Exam 6/15/2018       Prostate Cancer Screening      PSA  Nathalia Leon flowsheet data found.

## 2018-10-19 NOTE — PATIENT INSTRUCTIONS
Please continue your current medications. Continue to follow a healthy diet. Please try to exercise regularly. Please try to stop smoking. Return visit in 6 months.   Shayla Boswell's SCREENING SCHEDULE   Tests on this list are recommended by your screening (once between ages 73-68)  No results found for this or any previous visit.  Limited to patients who meet one of the following criteria:   • Men who are 73-68 years old and have smoked more than 100 cigarettes in their lifetime   • Anyone with a f End-stage renal disease   Hemophiliacs who received Factor VIII or IX concentrates   Clients of institutions for the mentally retarded   Persons who live in the same house as a HepB virus carrier   Homosexual men   Illicit injectable drug abusers     Tet

## 2018-10-21 ENCOUNTER — PATIENT MESSAGE (OUTPATIENT)
Dept: INTERNAL MEDICINE CLINIC | Facility: CLINIC | Age: 68
End: 2018-10-21

## 2018-10-22 ENCOUNTER — PATIENT OUTREACH (OUTPATIENT)
Dept: CASE MANAGEMENT | Age: 68
End: 2018-10-22

## 2018-10-22 DIAGNOSIS — E11.69 DYSLIPIDEMIA ASSOCIATED WITH TYPE 2 DIABETES MELLITUS (HCC): ICD-10-CM

## 2018-10-22 DIAGNOSIS — E03.8 SUBCLINICAL HYPOTHYROIDISM: ICD-10-CM

## 2018-10-22 DIAGNOSIS — E78.5 DYSLIPIDEMIA ASSOCIATED WITH TYPE 2 DIABETES MELLITUS (HCC): ICD-10-CM

## 2018-10-22 DIAGNOSIS — E11.9 TYPE 2 DIABETES MELLITUS WITHOUT COMPLICATION, WITHOUT LONG-TERM CURRENT USE OF INSULIN (HCC): ICD-10-CM

## 2018-10-22 NOTE — PROGRESS NOTES
[id number]  name and number provided for further follow up. Time Spent This Encounter Total:5min medical record review, telephone,  communication.        Monthly Minute Total including today:5

## 2018-10-22 NOTE — TELEPHONE ENCOUNTER
From: Karyn Arroyo  To: Estefany Ramirez MD  Sent: 10/21/2018 1:12 PM CDT  Subject: Other    10.21.18    Dr Daniel Moctezuma    1. Thank you for exam/Medicare physical.    2. Beginning 11.01.18 -- new insurance is [employer].     Thank you    D

## 2018-10-22 NOTE — PROGRESS NOTES
Spoke to [place], [place] verified for CCM call.     Medical History  Patient Active Problem List:     Dyslipidemia associated with type 2 diabetes mellitus ([facility] [address]     Subclinical hypothyroidism     Tobacco use     Glaucoma suspect     Hyperopia with astigmatis his insurance change on 11/1/18 that he may have a better benefit.         • Update to previous barriers: none    Patient Reported New Barriers And Concerns: none                   - Plan for overcoming all barriers: n/a          Patient agrees to goal acti

## 2018-10-31 PROCEDURE — 99490 CHRNC CARE MGMT STAFF 1ST 20: CPT

## 2018-11-08 ENCOUNTER — PATIENT OUTREACH (OUTPATIENT)
Dept: CASE MANAGEMENT | Age: 68
End: 2018-11-08

## 2018-11-14 ENCOUNTER — PATIENT MESSAGE (OUTPATIENT)
Dept: INTERNAL MEDICINE CLINIC | Facility: CLINIC | Age: 68
End: 2018-11-14

## 2018-11-15 RX ORDER — POLYETHYLENE GLYCOL 3350 17 G/17G
17 POWDER, FOR SOLUTION ORAL
Qty: 30 EACH | Refills: 3 | Status: SHIPPED | OUTPATIENT
Start: 2018-11-15 | End: 2020-03-30

## 2018-11-15 NOTE — TELEPHONE ENCOUNTER
From: Genet Jones  To: Mckay Brewer MD  Sent: 11/14/2018 3:21 PM CST  Subject: Non-Urgent Medical Question    11.14.18    Dr Omar Wells    Can you provide me with an Rx laxative - would be used on as needed basis - had a chance to try an  Rx laxativ

## 2018-12-21 ENCOUNTER — PATIENT OUTREACH (OUTPATIENT)
Dept: CASE MANAGEMENT | Age: 68
End: 2018-12-21

## 2018-12-21 NOTE — PROGRESS NOTES
Attempted to contact pt to notify Jerad Sees is no longer with [facility] and introduce myself as new Chronic Care Manager, no answer left detailed message for pt to call back.  Also notified on message I would be mailing letter with my information for his recor

## 2019-01-09 ENCOUNTER — PATIENT OUTREACH (OUTPATIENT)
Dept: CASE MANAGEMENT | Age: 69
End: 2019-01-09

## 2019-01-30 ENCOUNTER — PATIENT OUTREACH (OUTPATIENT)
Dept: CASE MANAGEMENT | Age: 69
End: 2019-01-30

## 2019-02-20 ENCOUNTER — PATIENT OUTREACH (OUTPATIENT)
Dept: CASE MANAGEMENT | Age: 69
End: 2019-02-20

## 2019-03-07 ENCOUNTER — PATIENT OUTREACH (OUTPATIENT)
Dept: CASE MANAGEMENT | Age: 69
End: 2019-03-07

## 2019-03-07 NOTE — PROGRESS NOTES
Attempted to contact pt no answer left detailed message for pt to call back. Will send letter next outreach if no answer.    Total time spent with patient including chart review: 2  Time spent with patient this month: 2    Total time spent with communicati

## 2019-03-25 ENCOUNTER — PATIENT OUTREACH (OUTPATIENT)
Dept: CASE MANAGEMENT | Age: 69
End: 2019-03-25

## 2019-05-23 PROBLEM — H25.13 AGE-RELATED NUCLEAR CATARACT OF BOTH EYES: Status: RESOLVED | Noted: 2017-06-13 | Resolved: 2019-05-23

## 2019-09-30 DIAGNOSIS — E11.65 TYPE 2 DIABETES MELLITUS WITH HYPERGLYCEMIA, WITHOUT LONG-TERM CURRENT USE OF INSULIN (HCC): ICD-10-CM

## 2019-09-30 RX ORDER — BLOOD SUGAR DIAGNOSTIC
STRIP MISCELLANEOUS
Qty: 100 STRIP | Refills: 3 | Status: SHIPPED | OUTPATIENT
Start: 2019-09-30 | End: 2019-10-21

## 2019-10-01 NOTE — TELEPHONE ENCOUNTER
Refill passed per [facility] protocol.   Refill Protocol Appointment Criteria  · Appointment scheduled in the past 12 months or in the next 3 months  Recent Outpatient Visits            4 months ago Tobacco use    [address]

## 2019-10-21 PROBLEM — E11.9 TYPE 2 DIABETES MELLITUS WITHOUT COMPLICATION, WITHOUT LONG-TERM CURRENT USE OF INSULIN (HCC): Status: RESOLVED | Noted: 2017-05-18 | Resolved: 2019-10-21

## 2020-03-30 RX ORDER — POLYETHYLENE GLYCOL 3350 17 G/17G
POWDER, FOR SOLUTION ORAL
Qty: 28 EACH | Refills: 5 | Status: SHIPPED | OUTPATIENT
Start: 2020-03-30 | End: 2020-12-27

## 2020-10-03 RX ORDER — POLYETHYLENE GLYCOL 3350 17 G/17G
POWDER, FOR SOLUTION ORAL
Qty: 28 EACH | Refills: 5 | OUTPATIENT
Start: 2020-10-03

## 2020-12-03 ENCOUNTER — HOSPITAL ENCOUNTER (OUTPATIENT)
Age: 70
Discharge: HOME | End: 2020-12-03
Attending: FAMILY MEDICINE
Payer: MEDICARE

## 2020-12-03 VITALS
OXYGEN SATURATION: 98 % | SYSTOLIC BLOOD PRESSURE: 164 MMHG | TEMPERATURE: 98 F | HEIGHT: 75 IN | HEART RATE: 90 BPM | DIASTOLIC BLOOD PRESSURE: 90 MMHG | WEIGHT: 200 LBS | RESPIRATION RATE: 18 BRPM | BODY MASS INDEX: 24.87 KG/M2

## 2020-12-03 DIAGNOSIS — Z20.822 ENCOUNTER FOR LABORATORY TESTING FOR COVID-19 VIRUS: Primary | ICD-10-CM

## 2020-12-03 PROCEDURE — 99202 OFFICE O/P NEW SF 15 MIN: CPT | Performed by: FAMILY MEDICINE

## 2020-12-03 NOTE — ED PROVIDER NOTES
Patient Seen in: [facility]      History   Patient presents with:  Covid-19 Test    Stated Complaint: COVID TEST    HPI    Pt is a 78 yo who presents for a covid test. No cold sx.  Pt has a h/o DM    Past Medical History:   Diagnosis Date   • ear normal.      Left Ear: Tympanic membrane, ear canal and external ear normal.      Nose: Nose normal.      Mouth/Throat:      Mouth: Mucous membranes are moist.      Pharynx: Oropharynx is clear.    Eyes:      Extraocular Movements: Extraocular movements

## 2020-12-16 ENCOUNTER — LAB ENCOUNTER (OUTPATIENT)
Dept: LAB | Age: 70
End: 2020-12-16
Attending: INTERNAL MEDICINE
Payer: MEDICARE

## 2020-12-16 DIAGNOSIS — Z13.0 SCREENING FOR DISORDER OF BLOOD AND BLOOD-FORMING ORGANS: ICD-10-CM

## 2020-12-16 DIAGNOSIS — Z13.29 ENCOUNTER FOR SCREENING FOR ENDOCRINE DISORDER: ICD-10-CM

## 2020-12-16 DIAGNOSIS — Z13.228 ENCOUNTER FOR SCREENING FOR METABOLIC DISORDER: ICD-10-CM

## 2020-12-16 DIAGNOSIS — Z13.220 ENCOUNTER FOR SCREENING FOR LIPID DISORDER: ICD-10-CM

## 2020-12-16 DIAGNOSIS — Z12.5 ENCOUNTER FOR SCREENING FOR MALIGNANT NEOPLASM OF PROSTATE: ICD-10-CM

## 2020-12-16 LAB
ALBUMIN SERPL-MCNC: 3.9 G/DL (ref 3.4–5)
ALBUMIN/GLOB SERPL: 1.2 {RATIO} (ref 1–2)
ALP LIVER SERPL-CCNC: 68 U/L
ALT SERPL-CCNC: 34 U/L
ANION GAP SERPL CALC-SCNC: 6 MMOL/L (ref 0–18)
AST SERPL-CCNC: 18 U/L (ref 15–37)
BASOPHILS # BLD AUTO: 0.08 X10(3) UL (ref 0–0.2)
BASOPHILS NFR BLD AUTO: 1 %
BILIRUB SERPL-MCNC: 0.6 MG/DL (ref 0.1–2)
BUN BLD-MCNC: 23 MG/DL (ref 7–18)
BUN/CREAT SERPL: 19.5 (ref 10–20)
CALCIUM BLD-MCNC: 9.1 MG/DL (ref 8.5–10.1)
CHLORIDE SERPL-SCNC: 106 MMOL/L (ref 98–112)
CHOLEST SMN-MCNC: 147 MG/DL (ref ?–200)
CO2 SERPL-SCNC: 28 MMOL/L (ref 21–32)
CREAT BLD-MCNC: 1.18 MG/DL
DEPRECATED RDW RBC AUTO: 45.8 FL (ref 35.1–46.3)
EOSINOPHIL # BLD AUTO: 0.47 X10(3) UL (ref 0–0.7)
EOSINOPHIL NFR BLD AUTO: 6.2 %
ERYTHROCYTE [DISTWIDTH] IN BLOOD BY AUTOMATED COUNT: 12.9 % (ref 11–15)
GLOBULIN PLAS-MCNC: 3.3 G/DL (ref 2.8–4.4)
GLUCOSE BLD-MCNC: 241 MG/DL (ref 70–99)
HCT VFR BLD AUTO: 45.3 %
HDLC SERPL-MCNC: 43 MG/DL (ref 40–59)
HGB BLD-MCNC: 15.1 G/DL
IMM GRANULOCYTES # BLD AUTO: 0.02 X10(3) UL (ref 0–1)
IMM GRANULOCYTES NFR BLD: 0.3 %
LDLC SERPL CALC-MCNC: 79 MG/DL (ref ?–100)
LYMPHOCYTES # BLD AUTO: 2.95 X10(3) UL (ref 1–4)
LYMPHOCYTES NFR BLD AUTO: 38.7 %
M PROTEIN MFR SERPL ELPH: 7.2 G/DL (ref 6.4–8.2)
MCH RBC QN AUTO: 32.5 PG (ref 26–34)
MCHC RBC AUTO-ENTMCNC: 33.3 G/DL (ref 31–37)
MCV RBC AUTO: 97.6 FL
MONOCYTES # BLD AUTO: 0.72 X10(3) UL (ref 0.1–1)
MONOCYTES NFR BLD AUTO: 9.4 %
NEUTROPHILS # BLD AUTO: 3.39 X10 (3) UL (ref 1.5–7.7)
NEUTROPHILS # BLD AUTO: 3.39 X10(3) UL (ref 1.5–7.7)
NEUTROPHILS NFR BLD AUTO: 44.4 %
NONHDLC SERPL-MCNC: 104 MG/DL (ref ?–130)
OSMOLALITY SERPL CALC.SUM OF ELEC: 302 MOSM/KG (ref 275–295)
PATIENT FASTING Y/N/NP: YES
PATIENT FASTING Y/N/NP: YES
PLATELET # BLD AUTO: 304 10(3)UL (ref 150–450)
POTASSIUM SERPL-SCNC: 4.4 MMOL/L (ref 3.5–5.1)
PSA SERPL-MCNC: 0.36 NG/ML (ref ?–4)
RBC # BLD AUTO: 4.64 X10(6)UL
SODIUM SERPL-SCNC: 140 MMOL/L (ref 136–145)
T4 FREE SERPL-MCNC: 1 NG/DL (ref 0.8–1.7)
TRIGL SERPL-MCNC: 124 MG/DL (ref 30–149)
TSI SER-ACNC: 6.92 MIU/ML (ref 0.36–3.74)
VLDLC SERPL CALC-MCNC: 25 MG/DL (ref 0–30)
WBC # BLD AUTO: 7.6 X10(3) UL (ref 4–11)

## 2020-12-16 PROCEDURE — 36415 COLL VENOUS BLD VENIPUNCTURE: CPT

## 2020-12-16 PROCEDURE — 84443 ASSAY THYROID STIM HORMONE: CPT

## 2020-12-16 PROCEDURE — 80061 LIPID PANEL: CPT

## 2020-12-16 PROCEDURE — 80053 COMPREHEN METABOLIC PANEL: CPT

## 2020-12-16 PROCEDURE — 84153 ASSAY OF PSA TOTAL: CPT

## 2020-12-16 PROCEDURE — 84439 ASSAY OF FREE THYROXINE: CPT

## 2020-12-16 PROCEDURE — 85025 COMPLETE CBC W/AUTO DIFF WBC: CPT

## 2021-07-12 ENCOUNTER — TELEPHONE (OUTPATIENT)
Dept: GASTROENTEROLOGY | Facility: CLINIC | Age: 71
End: 2021-07-12

## 2021-07-12 NOTE — TELEPHONE ENCOUNTER
----- Message from Noah Escamilla RN sent at 8/31/2016  6:12 PM CDT -----  Regarding: colonoscopy recall  Colonoscopy recall 5 yrs, due 8/31/2021

## 2021-07-13 NOTE — PROGRESS NOTES
Spoke to [place], [place] verified for CCM call.     Medical History  Patient Active Problem List:     Dyslipidemia associated with type 2 diabetes mellitus ([facility] [address]     Subclinical hypothyroidism     Tobacco use     Glaucoma suspect     Hyperopia with astigmatis Subjective   Bradley Mukherjee is a 32 y.o. male    Chief Complaint   Patient presents with   • Cough     productive   • URI     nasal congestion     You have chosen to receive care through a telehealth visit.  Do you consent to use a video/audio connection for your medical care today? Yes    This was an audio and video enabled telemedicine encounter.    URI   This is a new problem. The current episode started today. The problem has been unchanged. Associated symptoms include congestion, coughing, headaches, joint pain, rhinorrhea, sinus pain and a sore throat. Pertinent negatives include no abdominal pain, chest pain, diarrhea, dysuria, ear pain, joint swelling, nausea, neck pain, plugged ear sensation, rash, sneezing, swollen glands, vomiting or wheezing. Treatments tried: OTC cold and cough meds. The treatment provided no relief.        The following portions of the patient's history were reviewed and updated as appropriate: allergies, current medications, past family history, past medical history, past social history, past surgical history and problem list.    Current Outpatient Medications:   •  atorvastatin (LIPITOR) 80 MG tablet, TAKE 1 TABLET BY MOUTH EVERY DAY AT NIGHT, Disp: 30 tablet, Rfl: 0  •  brompheniramine-pseudoephedrine-DM 30-2-10 MG/5ML syrup, Take 10 mL by mouth 4 (Four) Times a Day As Needed for Congestion or Cough., Disp: 300 mL, Rfl: 0  •  ciprofloxacin-dexamethasone (Ciprodex) 0.3-0.1 % otic suspension, Administer 4 drops into the left ear 2 (Two) Times a Day., Disp: 7.5 mL, Rfl: 0  •  FLUoxetine (PROzac) 40 MG capsule, Take 1 capsule by mouth Daily., Disp: 90 capsule, Rfl: 1  •  metoprolol succinate XL (TOPROL-XL) 25 MG 24 hr tablet, Take 1 tablet by mouth Daily., Disp: 90 tablet, Rfl: 1  •  mupirocin (Bactroban) 2 % ointment, Apply  topically to the appropriate area as directed 3 (Three) Times a Day., Disp: 22 g, Rfl: 0     Review of Systems   Constitutional: Positive for fatigue.  Negative for chills and fever.   HENT: Positive for congestion, rhinorrhea, sinus pain and sore throat. Negative for ear pain and sneezing.    Respiratory: Positive for cough. Negative for chest tightness, shortness of breath and wheezing.    Cardiovascular: Negative for chest pain.   Gastrointestinal: Negative for abdominal pain, diarrhea, nausea and vomiting.   Endocrine: Negative for cold intolerance and heat intolerance.   Genitourinary: Negative for dysuria.   Musculoskeletal: Positive for arthralgias, joint pain and myalgias. Negative for neck pain.   Skin: Negative for rash.   Neurological: Positive for headaches. Negative for dizziness.       Objective   Physical Exam  Constitutional:       Appearance: Normal appearance.   HENT:      Head: Normocephalic.      Nose: Congestion present.      Mouth/Throat:      Mouth: Mucous membranes are moist.      Pharynx: Oropharynx is clear.   Eyes:      Pupils: Pupils are equal, round, and reactive to light.   Pulmonary:      Effort: Pulmonary effort is normal.   Musculoskeletal:         General: Normal range of motion.      Cervical back: Normal range of motion.   Neurological:      General: No focal deficit present.      Mental Status: He is alert and oriented to person, place, and time.   Psychiatric:         Mood and Affect: Mood normal.         Behavior: Behavior normal.       There were no vitals filed for this visit.      Assessment/Plan   Diagnoses and all orders for this visit:    1. Cough (Primary)  -     brompheniramine-pseudoephedrine-DM 30-2-10 MG/5ML syrup; Take 10 mL by mouth 4 (Four) Times a Day As Needed for Congestion or Cough.  Dispense: 300 mL; Refill: 0  -     COVID-19 PCR, [facility], NP SWAB IN [facility] VIRAL TRANSPORT MEDIA/ORAL SWISH 24-30 HR TAT - Swab, Nasopharynx; Future    2. Chest congestion  -     brompheniramine-pseudoephedrine-DM 30-2-10 MG/5ML syrup; Take 10 mL by mouth 4 (Four) Times a Day As Needed for Congestion or Cough.  Dispense: 300  · Patient reported progress toward goal: completed. New Goal (if previous met)  • What would you say is your biggest concerns about your health? Pt would like to get new bifocals.       • What steps do you think you could take to work on [employer] [facility]; Refill: 0  -     COVID-19 PCR, [facility], NP SWAB IN [facility] VIRAL TRANSPORT MEDIA/ORAL SWISH 24-30 HR TAT - Swab, Nasopharynx; Future      Patient will present to the clinic in the a.m. for Covid testing  Bromfed DM as directed as needed for cough congestion  Increase fluids  Quarantine until results are available

## 2022-08-12 ENCOUNTER — LAB ENCOUNTER (OUTPATIENT)
Dept: LAB | Age: 72
End: 2022-08-12
Attending: INTERNAL MEDICINE
Payer: MEDICARE

## 2022-08-12 DIAGNOSIS — E11.9 DIABETES MELLITUS WITHOUT COMPLICATION (HCC): ICD-10-CM

## 2022-08-12 DIAGNOSIS — I10 ESSENTIAL HYPERTENSION: ICD-10-CM

## 2022-08-12 DIAGNOSIS — Z12.11 COLON CANCER SCREENING: ICD-10-CM

## 2022-08-12 DIAGNOSIS — E11.65 TYPE 2 DIABETES MELLITUS WITH HYPERGLYCEMIA, WITHOUT LONG-TERM CURRENT USE OF INSULIN (HCC): ICD-10-CM

## 2022-08-12 LAB
CHOLEST SERPL-MCNC: 137 MG/DL (ref ?–200)
CREAT UR-SCNC: 27.3 MG/DL
FASTING PATIENT LIPID ANSWER: YES
HDLC SERPL-MCNC: 35 MG/DL (ref 40–59)
LDLC SERPL CALC-MCNC: 77 MG/DL (ref ?–100)
MICROALBUMIN UR-MCNC: 0.68 MG/DL
MICROALBUMIN/CREAT 24H UR-RTO: 24.9 UG/MG (ref ?–30)
NONHDLC SERPL-MCNC: 102 MG/DL (ref ?–130)
T4 FREE SERPL-MCNC: 1.2 NG/DL (ref 0.8–1.7)
TRIGL SERPL-MCNC: 141 MG/DL (ref 30–149)
TSI SER-ACNC: 6.06 MIU/ML (ref 0.36–3.74)
VLDLC SERPL CALC-MCNC: 22 MG/DL (ref 0–30)

## 2022-08-12 PROCEDURE — 80061 LIPID PANEL: CPT

## 2022-08-12 PROCEDURE — 83036 HEMOGLOBIN GLYCOSYLATED A1C: CPT | Performed by: INTERNAL MEDICINE

## 2022-08-12 PROCEDURE — 36415 COLL VENOUS BLD VENIPUNCTURE: CPT

## 2022-08-12 PROCEDURE — 84443 ASSAY THYROID STIM HORMONE: CPT

## 2022-08-12 PROCEDURE — 82043 UR ALBUMIN QUANTITATIVE: CPT

## 2022-08-12 PROCEDURE — 82570 ASSAY OF URINE CREATININE: CPT

## 2022-08-12 PROCEDURE — 84439 ASSAY OF FREE THYROXINE: CPT

## 2022-08-12 PROCEDURE — 85025 COMPLETE CBC W/AUTO DIFF WBC: CPT | Performed by: INTERNAL MEDICINE

## 2022-08-12 PROCEDURE — 84153 ASSAY OF PSA TOTAL: CPT | Performed by: INTERNAL MEDICINE

## 2022-08-12 PROCEDURE — 80053 COMPREHEN METABOLIC PANEL: CPT | Performed by: INTERNAL MEDICINE

## 2023-03-04 NOTE — PROGRESS NOTES
Jerel Yang  DOB12/27/1950 attended [employer] Class 3:     Date: 5/25/2017  Start time: 1 PM End time: 3 PM    Prevention, detection and treatment of chronic complications  Reviewed how to reduce risks of complications including eye, foot,
04-Mar-2023 12:24

## 2023-04-19 NOTE — PROGRESS NOTES
Chart Reviewed. [facility]. ([id number]).  name and number provided for further follow up. Time Spent This Encounter Total: 6 min medical record review, telephone,  communication.      Monthly Minute Total including today: 6 Payment Option: Option 1: Bill Medicare, await for decision on payment. Reason?: Medicare Allowed Amount Detail Level: Detailed Procedure (Limit To 20 Characters): ln2 Reason?: Additional Information

## 2023-09-13 NOTE — PROGRESS NOTES
Orders for admission, patient is aware of plan and ready to go upstairs. Any questions, please call ED RN Jenny at extension [phone].      Patient Covid vaccination status: Fully vaccinated     COVID Test Ordered in ED: Rapid SARS-CoV-2 by PCR    COVID Suspicion at Admission: N/A    Running Infusions:      Mental Status/LOC at time of transport: A&Ox2-3, lethargic and alert to voice    Other pertinent information: PT is bedbound at home  CIWA score: N/A   NIH score:  N/A Spoke to [place], [place] verified for CCM call.     Medical History  Patient Active Problem List:     Dyslipidemia associated with type 2 diabetes mellitus ([facility] [address]     Subclinical hypothyroidism     Tobacco use     Glaucoma suspect     Diabetes mellitus, type 2 • Patient reported progress toward goal: progressing, cyst removed.  Will have follow up on 6/7/17 for suture removal.       • Update to previous barriers: none    • Patient Reported New Barriers And Concerns: none                   - Plan for o

## 2024-09-24 ENCOUNTER — APPOINTMENT (OUTPATIENT)
Dept: CT IMAGING | Facility: HOSPITAL | Age: 74
End: 2024-09-24
Attending: STUDENT IN AN ORGANIZED HEALTH CARE EDUCATION/TRAINING PROGRAM
Payer: MEDICARE

## 2024-09-24 ENCOUNTER — HOSPITAL ENCOUNTER (OUTPATIENT)
Facility: HOSPITAL | Age: 74
Setting detail: OBSERVATION
Discharge: NURSING, REHAB OR LONG-TERM CARE | End: 2024-09-27
Attending: STUDENT IN AN ORGANIZED HEALTH CARE EDUCATION/TRAINING PROGRAM | Admitting: HOSPITALIST
Payer: MEDICARE

## 2024-09-24 ENCOUNTER — APPOINTMENT (OUTPATIENT)
Dept: GENERAL RADIOLOGY | Facility: HOSPITAL | Age: 74
End: 2024-09-24
Attending: STUDENT IN AN ORGANIZED HEALTH CARE EDUCATION/TRAINING PROGRAM
Payer: MEDICARE

## 2024-09-24 DIAGNOSIS — R41.82 ALTERED MENTAL STATUS, UNSPECIFIED ALTERED MENTAL STATUS TYPE: Primary | ICD-10-CM

## 2024-09-24 DIAGNOSIS — I10 HYPERTENSION, UNSPECIFIED TYPE: ICD-10-CM

## 2024-09-24 LAB
ALBUMIN SERPL-MCNC: 4.5 G/DL (ref 3.2–4.8)
ALBUMIN/GLOB SERPL: 2 {RATIO} (ref 1–2)
ALP LIVER SERPL-CCNC: 68 U/L
ALT SERPL-CCNC: 27 U/L
ANION GAP SERPL CALC-SCNC: 5 MMOL/L (ref 0–18)
AST SERPL-CCNC: 23 U/L (ref ?–34)
BASOPHILS # BLD AUTO: 0.08 X10(3) UL (ref 0–0.2)
BASOPHILS NFR BLD AUTO: 1.1 %
BILIRUB SERPL-MCNC: 0.8 MG/DL (ref 0.2–1.1)
BILIRUB UR QL: NEGATIVE
BUN BLD-MCNC: 13 MG/DL (ref 9–23)
BUN/CREAT SERPL: 12.4 (ref 10–20)
CALCIUM BLD-MCNC: 9.7 MG/DL (ref 8.7–10.4)
CHLORIDE SERPL-SCNC: 108 MMOL/L (ref 98–112)
CLARITY UR: CLEAR
CO2 SERPL-SCNC: 26 MMOL/L (ref 21–32)
CREAT BLD-MCNC: 1.05 MG/DL
DEPRECATED RDW RBC AUTO: 45.9 FL (ref 35.1–46.3)
EGFRCR SERPLBLD CKD-EPI 2021: 75 ML/MIN/1.73M2 (ref 60–?)
EOSINOPHIL # BLD AUTO: 0.3 X10(3) UL (ref 0–0.7)
EOSINOPHIL NFR BLD AUTO: 4.2 %
ERYTHROCYTE [DISTWIDTH] IN BLOOD BY AUTOMATED COUNT: 13 % (ref 11–15)
EST. AVERAGE GLUCOSE BLD GHB EST-MCNC: 160 MG/DL (ref 68–126)
ETHANOL SERPL-MCNC: <3 MG/DL (ref ?–3)
GLOBULIN PLAS-MCNC: 2.3 G/DL (ref 2–3.5)
GLUCOSE BLD-MCNC: 146 MG/DL (ref 70–99)
GLUCOSE BLDC GLUCOMTR-MCNC: 151 MG/DL (ref 70–99)
GLUCOSE BLDC GLUCOMTR-MCNC: 195 MG/DL (ref 70–99)
GLUCOSE BLDC GLUCOMTR-MCNC: 309 MG/DL (ref 70–99)
GLUCOSE UR-MCNC: NORMAL MG/DL
HBA1C MFR BLD: 7.2 % (ref ?–5.7)
HCT VFR BLD AUTO: 44 %
HGB BLD-MCNC: 15 G/DL
HGB UR QL STRIP.AUTO: NEGATIVE
IMM GRANULOCYTES # BLD AUTO: 0.02 X10(3) UL (ref 0–1)
IMM GRANULOCYTES NFR BLD: 0.3 %
KETONES UR-MCNC: NEGATIVE MG/DL
LEUKOCYTE ESTERASE UR QL STRIP.AUTO: NEGATIVE
LYMPHOCYTES # BLD AUTO: 2.7 X10(3) UL (ref 1–4)
LYMPHOCYTES NFR BLD AUTO: 37.9 %
MCH RBC QN AUTO: 32.8 PG (ref 26–34)
MCHC RBC AUTO-ENTMCNC: 34.1 G/DL (ref 31–37)
MCV RBC AUTO: 96.1 FL
MONOCYTES # BLD AUTO: 0.5 X10(3) UL (ref 0.1–1)
MONOCYTES NFR BLD AUTO: 7 %
NEUTROPHILS # BLD AUTO: 3.52 X10 (3) UL (ref 1.5–7.7)
NEUTROPHILS # BLD AUTO: 3.52 X10(3) UL (ref 1.5–7.7)
NEUTROPHILS NFR BLD AUTO: 49.5 %
NITRITE UR QL STRIP.AUTO: NEGATIVE
OSMOLALITY SERPL CALC.SUM OF ELEC: 291 MOSM/KG (ref 275–295)
PH UR: 7.5 [PH] (ref 5–8)
PLATELET # BLD AUTO: 325 10(3)UL (ref 150–450)
POTASSIUM SERPL-SCNC: 3.8 MMOL/L (ref 3.5–5.1)
PROT SERPL-MCNC: 6.8 G/DL (ref 5.7–8.2)
PROT UR-MCNC: NEGATIVE MG/DL
RBC # BLD AUTO: 4.58 X10(6)UL
SARS-COV-2 RNA RESP QL NAA+PROBE: NOT DETECTED
SODIUM SERPL-SCNC: 139 MMOL/L (ref 136–145)
SP GR UR STRIP: 1.01 (ref 1–1.03)
TSI SER-ACNC: 3.08 MIU/ML (ref 0.55–4.78)
UROBILINOGEN UR STRIP-ACNC: NORMAL
WBC # BLD AUTO: 7.1 X10(3) UL (ref 4–11)

## 2024-09-24 PROCEDURE — 70450 CT HEAD/BRAIN W/O DYE: CPT | Performed by: STUDENT IN AN ORGANIZED HEALTH CARE EDUCATION/TRAINING PROGRAM

## 2024-09-24 PROCEDURE — 71045 X-RAY EXAM CHEST 1 VIEW: CPT | Performed by: STUDENT IN AN ORGANIZED HEALTH CARE EDUCATION/TRAINING PROGRAM

## 2024-09-24 RX ORDER — AMLODIPINE BESYLATE 5 MG/1
5 TABLET ORAL ONCE
Status: COMPLETED | OUTPATIENT
Start: 2024-09-24 | End: 2024-09-24

## 2024-09-24 RX ORDER — NICOTINE POLACRILEX 4 MG
15 LOZENGE BUCCAL
Status: DISCONTINUED | OUTPATIENT
Start: 2024-09-24 | End: 2024-09-27

## 2024-09-24 RX ORDER — NICOTINE POLACRILEX 4 MG
30 LOZENGE BUCCAL
Status: DISCONTINUED | OUTPATIENT
Start: 2024-09-24 | End: 2024-09-27

## 2024-09-24 RX ORDER — METOCLOPRAMIDE HYDROCHLORIDE 5 MG/ML
10 INJECTION INTRAMUSCULAR; INTRAVENOUS EVERY 8 HOURS PRN
Status: DISCONTINUED | OUTPATIENT
Start: 2024-09-24 | End: 2024-09-25

## 2024-09-24 RX ORDER — ONDANSETRON 2 MG/ML
4 INJECTION INTRAMUSCULAR; INTRAVENOUS EVERY 6 HOURS PRN
Status: DISCONTINUED | OUTPATIENT
Start: 2024-09-24 | End: 2024-09-27

## 2024-09-24 RX ORDER — ACETAMINOPHEN 500 MG
500 TABLET ORAL EVERY 4 HOURS PRN
Status: DISCONTINUED | OUTPATIENT
Start: 2024-09-24 | End: 2024-09-27

## 2024-09-24 RX ORDER — DEXTROSE MONOHYDRATE 25 G/50ML
50 INJECTION, SOLUTION INTRAVENOUS
Status: DISCONTINUED | OUTPATIENT
Start: 2024-09-24 | End: 2024-09-27

## 2024-09-24 RX ORDER — HEPARIN SODIUM 5000 [USP'U]/ML
5000 INJECTION, SOLUTION INTRAVENOUS; SUBCUTANEOUS EVERY 8 HOURS SCHEDULED
Status: DISCONTINUED | OUTPATIENT
Start: 2024-09-24 | End: 2024-09-27

## 2024-09-24 NOTE — ED INITIAL ASSESSMENT (HPI)
Patient to ED via EMS for multiple complaints. Patient asked why he was brought to the hospital and patient stated, \"well I'm in shock!\" States he has a long list of things making him feel \"shocky\", stated it started with his \"high water bill\". States his house is falling a part and he's, \"in complete shock\" and \"it snapped this morning\". States that, \"If the government wants they can come and shoot me but I won't let them anywhere near me\". States he is trying to write a book \"I'm seeing things I haven't seen in forever.\" \"If you want to take me out and dump me on a curb that's fine\".     \"Prescriptions ran out a log time ago\" States over the last several weeks he has felt sick to his stomach and has had rashes everywhere. No rashes on arrival. A/Ox4

## 2024-09-24 NOTE — H&P
[facility] Hospitalist H&P       CC:   Chief Complaint   Patient presents with    Other    Eval-P        PCP: Anthony Singh MD    Date of Admission: 9/24/2024  2:00 PM    ASSESSMENT / PLAN:       Mr. Boswell is a 74 yo M with PMH of HTN, HL, DM2, who presented with confusion.    AMS  - unclear baseline, no known hx of psychiatric disorders, patient states everything snapped when he got a $140 water bill today and he couldn't do it anymore, no SI/HI  - no focal neuro deficits  - CT head negative  - will consult psychiatry  - check UDS    Elevated  HTN  - BP elevated in ED, denies hs of HTN  - given amlodipine in ED  - will start lisinopril given DM2    DM2  - home regimen: previously on metformin and pioglitazone- currently off all meds as they ran out and he has not seen PCP in over 3 years  - accuchecks QID, hypoglycemic protocol, SSI  - check HgA1c    HL  - previously on statin    Dental cares/dental fracture  - dental XR, rule out abscess    Glaucoma  - f/u ophtho    Social  - lives alone, states house is falling apart and he isn't able to afford to live in the home anymore  - will have SW consult to discuss options    FN:  - IVF: NS  - Diet: diabetic    DVT Prophy: SCD  Lines: PIV    Dispo: pending clinical course    Outpatient records or previous hospital records reviewed.     Further recommendations pending patient's clinical course.  [facility] hospitalist to continue to follow patient while in house    Patient and/or patient's family given opportunity to ask questions and note understanding and agreeing with therapeutic plan as outlined    Geraldine Escalera MD  [facility] Hospitalist  Answering Service number: [phone]    HPI       History of Present Illness:      Mr. Boswell is a 74 yo M with PMH of HTN, HL, DM2, who presented with confusion. Patient states everything snapped today when he received a $140 water bill. Patient lives alone, struggles financially with upkeep of the house, normally orders [employer]  delivery but currently has no food at the hose. States he is struggling and did not know who to call. States he initially called the police. No SI/HI, not hearing voices. Does state his vision has been blurry, told he has cataracts and glaucoma, wears glasses but has not seen an eye doctor in many years. Has broken teeth and cavities but has not seen a dentist, does complain of pain and fullness. No fevers or chills at home. Does not have any close family. Has a sister but she lives in [place] and they are not close. Denies ETOH or drug use.     In the ED, BP elevated, other vitals stable, labs normal except mildly elevated BG.     PMH  Past Medical History:    Arthritis    Dyslipidemia associated with type 2 diabetes mellitus (HCC)    Essential hypertension    Kidney stone    Subclinical hypothyroidism    Type 2 diabetes mellitus (HCC)        PSH  Past Surgical History:   Procedure Laterality Date    Elbow surgery Left 1962    Dislocation    Incision and drainage  08/2016    Abscess back    Incision and drainage  03/13/2018    Abscess back    Knee surgery Left 1975    Meniscal tear    Skin surgery N/A 05/18/2018    Excision epidermal inclusion cyst, midback        ALL:  No Known Allergies     Home Medications:  No outpatient medications have been marked as taking for the 9/24/24 encounter (Hospital Encounter).         Soc Hx  Social History     Tobacco Use    Smoking status: Every Day     Current packs/day: 0.50     Average packs/day: 0.5 packs/day for 26.0 years (13.0 ttl pk-yrs)     Types: Cigarettes    Smokeless tobacco: Never    Tobacco comments:     less than half a pack a day   Substance Use Topics    Alcohol use: No     Alcohol/week: 0.0 standard drinks of alcohol        Fam Hx  Family History   Problem Relation Age of Onset    Heart Disease Father         Details unknown    Diabetes Father     Hypertension Father     Diabetes Mother     Hypertension Mother     Other (CHF) Mother     Diabetes Paternal Grandfather      Diabetes Brother     Hypertension Brother     Glaucoma Neg        Review of Systems  Comprehensive ROS reviewed and negative except for what's stated above.     OBJECTIVE:  BP (!) 185/95   Pulse 56   Temp 97.9 °F (36.6 °C) (Temporal)   Resp 16   Ht 6' 3\" (1.905 m)   Wt 200 lb (90.7 kg)   SpO2 100%   BMI 25.00 kg/m²     GEN: male in NAD, does not appear disheveled, well nourished  HEENT: EOMI  Pulm: CTAB, no crackles or wheezes  CV: RRR, no murmurs  ABD: Soft, non-tender, non-distended, +BS  SKIN: warm, dry  EXT: no edema    Diagnostic Data:    CBC/Chem    Recent Labs   Lab 09/24/24  1410   RBC 4.58   HGB 15.0   HCT 44.0   MCV 96.1   MCH 32.8   MCHC 34.1   RDW 13.0   NEPRELIM 3.52   WBC 7.1   .0         Recent Labs   Lab 09/24/24  1410   *   BUN 13   CREATSERUM 1.05   EGFRCR 75   CA 9.7      K 3.8      CO2 26.0     Lab Results   Component Value Date    WBC 7.1 09/24/2024    HGB 15.0 09/24/2024    HCT 44.0 09/24/2024    .0 09/24/2024    CREATSERUM 1.05 09/24/2024    BUN 13 09/24/2024     09/24/2024    K 3.8 09/24/2024     09/24/2024    CO2 26.0 09/24/2024     09/24/2024    CA 9.7 09/24/2024    ALB 4.5 09/24/2024    ALKPHO 68 09/24/2024    BILT 0.8 09/24/2024    TP 6.8 09/24/2024    AST 23 09/24/2024    ALT 27 09/24/2024       No results for input(s): \"TROP\" in the last 168 hours.    Additional Diagnostics:     Radiology: CT BRAIN OR HEAD (CPT=70450)    Result Date: 9/24/2024  CONCLUSION:   No acute intracranial abnormality.  Mild chronic microvascular white matter ischemia.    Dictated by (CST): Blaise Patel MD on 9/24/2024 at 3:32 PM     Finalized by (CST): Blaise Patel MD on 9/24/2024 at 3:35 PM

## 2024-09-24 NOTE — ED QUICK NOTES
Orders for admission, patient is aware of plan and ready to go upstairs. Any questions, please call ED RN Paz at extension [phone].     Patient Covid vaccination status: Fully vaccinated     COVID Test Ordered in ED: Rapid SARS-CoV-2 by PCR    COVID Suspicion at Admission: N/A    Running Infusions:  None    Mental Status/LOC at time of transport: A/ox3-4    Other pertinent information: Lives at home alone; Needs reinforcement  CIWA score: N/A   NIH score:  N/A         DIFFICULTY WALKING / IMBALANCE/iván clear hx of syncope

## 2024-09-24 NOTE — ED PROVIDER NOTES
History     Chief Complaint   Patient presents with    Other    Eval-P       HPI    73 year old male brought in by EMS for evaluation.  Patient states he called 911 to get help, he states that \" raccoons were falling through the ceiling and this is very serious and it has been going on for some time now.\"  States he was unable to care for himself, he tried to get the [facility] to come help him.  He states he has been off of his medicines for many months now.  Patient then talks about needing a tooth pulled but the dentist was not able to do it because his blood pressure was elevated.  He is unsure why he is in the emergency department at this time.          Past Medical History:    Arthritis    Dyslipidemia associated with type 2 diabetes mellitus (HCC)    Essential hypertension    Kidney stone    Subclinical hypothyroidism    Type 2 diabetes mellitus (HCC)       Past Surgical History:   Procedure Laterality Date    Elbow surgery Left 1962    Dislocation    Incision and drainage  08/2016    Abscess back    Incision and drainage  03/13/2018    Abscess back    Knee surgery Left 1975    Meniscal tear    Skin surgery N/A 05/18/2018    Excision epidermal inclusion cyst, midback       Social History     Socioeconomic History    Marital status:     Number of children: 0   Occupational History    Occupation: computer software,      Comment: retired   Tobacco Use    Smoking status: Every Day     Current packs/day: 0.50     Average packs/day: 0.5 packs/day for 26.0 years (13.0 ttl pk-yrs)     Types: Cigarettes    Smokeless tobacco: Never    Tobacco comments:     less than half a pack a day   Vaping Use    Vaping status: Never Used   Substance and Sexual Activity    Alcohol use: No     Alcohol/week: 0.0 standard drinks of alcohol    Drug use: No   Other Topics Concern    Pt has a pacemaker No    Pt has a defibrillator No    Reaction to local anesthetic No                   Physical Exam     ED Triage  Vitals [09/24/24 1413]   BP (!) 175/98   Pulse 60   Resp 18   Temp 97.9 °F (36.6 °C)   Temp src Temporal   SpO2 98 %   O2 Device None (Room air)       Physical Exam  Constitutional:       General: He is not in acute distress.  Eyes:      Extraocular Movements: Extraocular movements intact.   Cardiovascular:      Rate and Rhythm: Normal rate and regular rhythm.      Pulses: Normal pulses.   Pulmonary:      Effort: Pulmonary effort is normal. No respiratory distress.      Breath sounds: Normal breath sounds.   Abdominal:      General: Abdomen is flat. There is no distension.      Tenderness: There is no abdominal tenderness. There is no guarding.   Musculoskeletal:      Cervical back: Normal range of motion.   Neurological:      Mental Status: He is alert.      Cranial Nerves: No cranial nerve deficit.      Sensory: No sensory deficit.      Motor: No weakness.      Coordination: Coordination normal.              ED Course     Labs Reviewed   COMP METABOLIC PANEL (14) - Abnormal; Notable for the following components:       Result Value    Glucose 146 (*)     All other components within normal limits   POCT GLUCOSE - Abnormal; Notable for the following components:    POC Glucose  151 (*)     All other components within normal limits   RAPID SARS-COV-2 BY PCR - Normal   CBC WITH DIFFERENTIAL WITH PLATELET   URINALYSIS WITH CULTURE REFLEX   ETHYL ALCOHOL   TSH W REFLEX TO FREE T4   RAINBOW DRAW LAVENDER   RAINBOW DRAW LIGHT GREEN   RAINBOW DRAW BLUE   RAINBOW DRAW GOLD     XR CHEST AP PORTABLE  (CPT=71045)    Result Date: 9/24/2024  CONCLUSION:   Negative for radiographically evident acute intrathoracic process.   el-remote  Dictated by (CST): Darwin Colindres MD on 9/24/2024 at 4:12 PM     Finalized by (CST): Darwin Colindres MD on 9/24/2024 at 4:13 PM          CT BRAIN OR HEAD (CPT=70450)    Result Date: 9/24/2024  CONCLUSION:   No acute intracranial abnormality.  Mild chronic microvascular white matter ischemia.     Dictated by (CST): Blaise Patel MD on 9/24/2024 at 3:32 PM     Finalized by (CST): Blaise Patel MD on 9/24/2024 at 3:35 PM               MDM     Vitals:    09/24/24 1413 09/24/24 1545 09/24/24 1600   BP: (!) 175/98 (!) 185/115 (!) 185/95   Pulse: 60 62 56   Resp: 18 18 16   Temp: 97.9 °F (36.6 °C)     TempSrc: Temporal     SpO2: 98% 99% 100%   Weight: 90.7 kg     Height: 190.5 cm (6' 3\")         Patient appears quite altered, having delusions, possible hallucinations.  Per chart review no prior history of dementia seen.  Possible metabolic encephalopathy, infectious encephalopathy, no signs of trauma, will get CT head however to evaluate for traumatic injuries.    I discussed with his sister regarding more information on this patient, sister states they have very infrequently talking she lives in [place].  Patient has no other support here and she cannot provide any further assistance.    ED Course as of 09/24/24 1614  ------------------------------------------------------------  Time: 09/24 1537  Comment: My interpretation of CT without traumatic injuries  ------------------------------------------------------------  Time: 09/24 1538  Comment: Labs without leukocytosis or acidosis.  Reassuring hemoglobin and renal function.  Urinalysis unremarkable  ------------------------------------------------------------  Time: 09/24 1539  Comment: Given Norvasc for blood pressure  ------------------------------------------------------------  Time: 09/24 7002  Comment: Case endorsed to the hospitalist for further workup and care         Disposition and Plan     Clinical Impression:  1. Altered mental status, unspecified altered mental status type    2. Hypertension, unspecified type        Disposition:  Admit    Follow-up:  No follow-up provider specified.    Medications Prescribed:  Current Discharge Medication List          Hospital Problems       Present on Admission  Date Reviewed: 10/5/2021            ICD-10-CM  Noted POA    * (Principal) Altered mental status, unspecified altered mental status type R41.82 9/24/2024 Unknown

## 2024-09-25 ENCOUNTER — APPOINTMENT (OUTPATIENT)
Dept: GENERAL RADIOLOGY | Facility: HOSPITAL | Age: 74
End: 2024-09-25
Attending: HOSPITALIST
Payer: MEDICARE

## 2024-09-25 PROBLEM — F41.0 GENERALIZED ANXIETY DISORDER WITH PANIC ATTACKS: Status: ACTIVE | Noted: 2024-09-25

## 2024-09-25 PROBLEM — F41.1 GENERALIZED ANXIETY DISORDER WITH PANIC ATTACKS: Status: ACTIVE | Noted: 2024-09-25

## 2024-09-25 LAB
AMPHET UR QL SCN: NEGATIVE
ANION GAP SERPL CALC-SCNC: 7 MMOL/L (ref 0–18)
BENZODIAZ UR QL SCN: NEGATIVE
BUN BLD-MCNC: 16 MG/DL (ref 9–23)
BUN/CREAT SERPL: 18.8 (ref 10–20)
CALCIUM BLD-MCNC: 9 MG/DL (ref 8.7–10.4)
CANNABINOIDS UR QL SCN: NEGATIVE
CHLORIDE SERPL-SCNC: 105 MMOL/L (ref 98–112)
CO2 SERPL-SCNC: 24 MMOL/L (ref 21–32)
COCAINE UR QL: NEGATIVE
CREAT BLD-MCNC: 0.85 MG/DL
CREAT UR-SCNC: 39.3 MG/DL
DEPRECATED RDW RBC AUTO: 45.2 FL (ref 35.1–46.3)
EGFRCR SERPLBLD CKD-EPI 2021: 92 ML/MIN/1.73M2 (ref 60–?)
ERYTHROCYTE [DISTWIDTH] IN BLOOD BY AUTOMATED COUNT: 12.8 % (ref 11–15)
FENTANYL UR QL SCN: NEGATIVE
GLUCOSE BLD-MCNC: 114 MG/DL (ref 70–99)
GLUCOSE BLDC GLUCOMTR-MCNC: 134 MG/DL (ref 70–99)
GLUCOSE BLDC GLUCOMTR-MCNC: 148 MG/DL (ref 70–99)
GLUCOSE BLDC GLUCOMTR-MCNC: 187 MG/DL (ref 70–99)
GLUCOSE BLDC GLUCOMTR-MCNC: 262 MG/DL (ref 70–99)
HCT VFR BLD AUTO: 39.5 %
HGB BLD-MCNC: 13.8 G/DL
MCH RBC QN AUTO: 33.3 PG (ref 26–34)
MCHC RBC AUTO-ENTMCNC: 34.9 G/DL (ref 31–37)
MCV RBC AUTO: 95.4 FL
MDMA UR QL SCN: NEGATIVE
OPIATES UR QL SCN: NEGATIVE
OSMOLALITY SERPL CALC.SUM OF ELEC: 284 MOSM/KG (ref 275–295)
OXYCODONE UR QL SCN: NEGATIVE
PLATELET # BLD AUTO: 302 10(3)UL (ref 150–450)
POTASSIUM SERPL-SCNC: 3.7 MMOL/L (ref 3.5–5.1)
RBC # BLD AUTO: 4.14 X10(6)UL
SODIUM SERPL-SCNC: 136 MMOL/L (ref 136–145)
WBC # BLD AUTO: 10.9 X10(3) UL (ref 4–11)

## 2024-09-25 PROCEDURE — 70355 PANORAMIC X-RAY OF JAWS: CPT | Performed by: HOSPITALIST

## 2024-09-25 PROCEDURE — 90792 PSYCH DIAG EVAL W/MED SRVCS: CPT | Performed by: OTHER

## 2024-09-25 RX ORDER — ESCITALOPRAM OXALATE 5 MG/1
5 TABLET ORAL NIGHTLY
Status: DISCONTINUED | OUTPATIENT
Start: 2024-09-25 | End: 2024-09-27

## 2024-09-25 RX ORDER — IBUPROFEN 400 MG/1
400 TABLET, FILM COATED ORAL EVERY 6 HOURS PRN
Status: DISCONTINUED | OUTPATIENT
Start: 2024-09-25 | End: 2024-09-27

## 2024-09-25 RX ORDER — OLANZAPINE 2.5 MG/1
2.5 TABLET, FILM COATED ORAL NIGHTLY
Status: DISCONTINUED | OUTPATIENT
Start: 2024-09-25 | End: 2024-09-27

## 2024-09-25 NOTE — PLAN OF CARE
Problem: Patient Centered Care  Goal: Patient preferences are identified and integrated in the patient's plan of care  Description: Interventions:  - What would you like us to know as we care for you?  - Provide timely, complete, and accurate information to patient/family  - Incorporate patient and family knowledge, values, beliefs, and cultural backgrounds into the planning and delivery of care  - Encourage patient/family to participate in care and decision-making at the level they choose  - Honor patient and family perspectives and choices  Outcome: Progressing     Problem: Diabetes/Glucose Control  Goal: Glucose maintained within prescribed range  Description: INTERVENTIONS:  - Monitor Blood Glucose as ordered  - Assess for signs and symptoms of hyperglycemia and hypoglycemia  - Administer ordered medications to maintain glucose within target range  - Assess barriers to adequate nutritional intake and initiate nutrition consult as needed  - Instruct patient on self management of diabetes  Outcome: Progressing     Problem: Patient/Family Goals  Goal: Patient/Family Long Term Goal  Description: Patient's Long Term Goal:    Interventions  - See additional Care Plan goals for specific interventions  Outcome: Progressing  Goal: Patient/Family Short Term Goal  Description: Patient's Short Term Goal:    Interventions:   - See additional Care Plan goals for specific interventions  Outcome: Progressing

## 2024-09-25 NOTE — CM/SW NOTE
09/25/24 1500   / Referral Data   Referral Source Physician   Reason for Referral Discharge planning;Financial issues   Informant Patient   Medical Hx   Does patient have an established PCP? Yes  (Anthony Beckwith MD)   Significant Past Medical/Mental Health Hx See history and physical   Patient Info   Patient's Current Mental Status at Time of Assessment Alert   Patient's Home Environment House   Number of Levels in Home 1   Patient lives with Alone   Patient Status Prior to Admission   Independent with ADLs and Mobility No   Pt. requires assistance with Ambulating;Driving   Services in place prior to admission DME/Supplies at home   Type of DME/Supplies Wheeled Walker;Scooter   Discharge Needs   Anticipated D/C needs Long term care facility     CM met with patient at bedside to discuss discharge planning. Address on file and PCP verified.  Patient reports he \"hasn't seen a doctor in years.\"  Per chart review, several refill requests were sent to Anthony Beckwith MD 6+ months ago.  Patient admits to noncompliance with medications/PCP visits.    Patient lives alone in his \"childhood home.\"  Patient states the only family member he has is his sister Martha (contact on face sheet), but states he has a strained relationship with his sister because \"He's not about playing Monopoly.\" CM inferred from conversation there is a possibility of family disagreement regarding finances.    Patient reports the house is in great disrepair - parts of the roof have collapsed, raccoons have recently inhabited his home.  Patient no longer drives, but utilizes [employer] [place] for transportation needs. Patient primarily ambulates with a walker, but also owns an electric scooter.    CM called [place] [employer] liaison [id number] to inquire patient Medicaid eligibility.  [place] [employer] states patient has active Medicaid with BCBS plan.    CM discussed LTC with patient.  Patient is agreeable to transitioning to LTC facility using  Medicaid benefits.  CM sent LTC referrals via [facility], list provided to patient at bedside.  CM re-opened referral to additional facilities for more options.  Patient expressed he feels overwhelmed with this transition.    CM contacted [facility] liaison Marina [phone], requesting she speak with patient to go over additional questions he has regarding LTC planning/what his options are.  Marina informed CM that based on the information provided, transitioning patient directly to LTC using Medicaid is the ideal option at this time.  Marina states supportive living typically require more assets and can take weeks to arrange, and this likely will not be completed prior to medical clearance from [facility]. Marina confirms she will either call or make a visit to patient's room to discuss LTC options with him directly.    / to remain available for support and/or discharge planning.     Plan: LTC facility, pending facility choice, medical clearance    Luz Marina Branch RN, BSN  Nurse   [phone]

## 2024-09-25 NOTE — CONSULTS
[facility]  part of [facility]    Report of Consultation    Wong Boswell Patient Status:  Observation    1950 MRN [id number]   Location [facility] 5SW/SE Attending Geraldine Escalera MD   Hosp Day # 0 PCP Anthony Singh MD     Date of Admission:  2024  Date of Consult:  2024   Reason for Consultation:   Patient presented with odd, bizarre, erratic behaviors, Geraldine Lutz MD requested psychiatric consult for evaluation and advice.    Consult Duration     The patient seen for initial psychiatric consult evaluation.   Record reviewed, communication with attending, communication with RN and patient seen face to face evaluation.    History of Present Illness:   Patient is a 73 year old   male lives on his own with past medical history of HTN, HL, and T2DM who was admitted to the hospital for Altered mental status, unspecified altered mental status type: The patient has been demonstrating confusion and bizarre behaviors. Patient indicated for psych consult for evaluation and advise.    Per chart review, the patient presented to the hospital on 24 with altered mental status.     Per ED MD and RN notes pt stated the following in the ED:   - Patient asked why he was brought to the hospital and patient stated, \"well I'm in shock!\"   - States he has a long list of things making him feel \"shocky\", stated it started with his \"high water bill\".   - States his house is falling a part and he's, \"in complete shock\" and \"it snapped this morning\".   - States, \"If the government wants they can come and shoot me but I won't let them anywhere near me\".   - States he is trying to write a book \"I'm seeing things I haven't seen in forever.\"   - \"If you want to take me out and dump me on a curb that's fine\"   -  \"Raccoons were falling through the ceiling and this is very serious and it has been going on for some time now.\"    - States he was unable to  care for himself, he tried to get the [facility] him.  - He states he has been off of his medicines for many months now       Labs and imaging reviewed: Alcohol negative, urine drug screen negative, WBC 10.9, glucose 114, Na 136, CT head negative for acute findings.     Vital signs: 117/70, HR 59, resp 18, temp 98.7      The patient seen today in hospital gown laying in bed.     The patient is alert and oriented to person, place, time, and event.     The patient has mild difficulty answering questions and engaging in appropriate conversation due to, tangential thought process and response to internal stimuli.     The patient explained that he has been living in his old parents house.  His parents and sibling  except his sister he does not hear from.  Patient reported that he barely making it from day today and find it financially stressful.    The patient reporting feelings of depression, low mood, low energy, decreased motivation. He states that things have \"been hard\" as he lives alone and has to care for himself. The breaking point for him was receiving a water bill for $170 on 25 as his stress and anxiety have been mounting for some time. At that point he called 911 and he was brought to the ED.     The patient reporting increased anxiety, worry, ruminations with impairment in sleep. He states multiple times he feels \"shocky\". This is especially relating to living in his old home which he states is \"falling apart\" and living/ caring for himself alone. He states he has no children and is not  sure if his sister is dead or alive, as he is \"not playing monopoly\".     The patient reporting that he is content by being alone.  He reported short marriage and could be .  No children \"which make it much better\".  The patient seem aloof and disconnected socially.    The patient denies auditory or visual hallucinations.  The patient denies suicidal or homicidal ideation.    The patient has  been demonstrating odd, bizarre, concerning behaviors with paranoia, delusional ideations and severe anxiety with difficulty tolerating stress.  He acknowledging he cannot care for himself and he can do the old house he needs someone to care for him.    Past Psychiatric/Medication History:  1. Prior diagnoses: No official diagnosis or treatment  2. Past psychiatric inpatient: unknown  3. Past outpatient history: \"some in college\"   4. Past suicide history: unknown  5. Medication history: unknown    Social History:   Pt lives alone and is . He has no children. He lives in a very old home that his a great concern to him.      Family History:  Not able to obtain   Medical History:   Past Medical History  Past Medical History:    Arthritis    Dyslipidemia associated with type 2 diabetes mellitus (HCC)    Essential hypertension    Kidney stone    Subclinical hypothyroidism    Type 2 diabetes mellitus (HCC)       Past Surgical History  Past Surgical History:   Procedure Laterality Date    Elbow surgery Left 1962    Dislocation    Incision and drainage  08/2016    Abscess back    Incision and drainage  03/13/2018    Abscess back    Knee surgery Left 1975    Meniscal tear    Skin surgery N/A 05/18/2018    Excision epidermal inclusion cyst, midback       Family History  Family History   Problem Relation Age of Onset    Heart Disease Father         Details unknown    Diabetes Father     Hypertension Father     Diabetes Mother     Hypertension Mother     Other (CHF) Mother     Diabetes Paternal Grandfather     Diabetes Brother     Hypertension Brother     Glaucoma Neg        Social History  Social History     Socioeconomic History    Marital status:     Number of children: 0   Occupational History    Occupation: computer software,      Comment: retired   Tobacco Use    Smoking status: Every Day     Current packs/day: 0.50     Average packs/day: 0.5 packs/day for 26.0 years (13.0 ttl pk-yrs)     Types:  Cigarettes    Smokeless tobacco: Never    Tobacco comments:     less than half a pack a day   Vaping Use    Vaping status: Never Used   Substance and Sexual Activity    Alcohol use: No     Alcohol/week: 0.0 standard drinks of alcohol    Drug use: No   Other Topics Concern    Pt has a pacemaker No    Pt has a defibrillator No    Reaction to local anesthetic No     Social Determinants of Health     Food Insecurity: Food Insecurity Present (9/24/2024)    Food Insecurity     Food Insecurity: Sometimes true   Transportation Needs: Unmet Transportation Needs (9/24/2024)    Transportation Needs     Lack of Transportation: Yes   Housing Stability: Low Risk  (9/24/2024)    Housing Stability     Housing Instability: No           Current Medications:  Current Facility-Administered Medications   Medication Dose Route Frequency    ibuprofen (Motrin) tab 400 mg  400 mg Oral Q6H PRN    glucose (Dex4) 15 GM/59ML oral liquid 15 g  15 g Oral Q15 Min PRN    Or    glucose (Glutose) 40% oral gel 15 g  15 g Oral Q15 Min PRN    Or    glucose-vitamin C (Dex-4) chewable tab 4 tablet  4 tablet Oral Q15 Min PRN    Or    dextrose 50% injection 50 mL  50 mL Intravenous Q15 Min PRN    Or    glucose (Dex4) 15 GM/59ML oral liquid 30 g  30 g Oral Q15 Min PRN    Or    glucose (Glutose) 40% oral gel 30 g  30 g Oral Q15 Min PRN    Or    glucose-vitamin C (Dex-4) chewable tab 8 tablet  8 tablet Oral Q15 Min PRN    heparin (Porcine) 5000 UNIT/ML injection 5,000 Units  5,000 Units Subcutaneous Q8H RONY    acetaminophen (Tylenol Extra Strength) tab 500 mg  500 mg Oral Q4H PRN    ondansetron (Zofran) 4 MG/2ML injection 4 mg  4 mg Intravenous Q6H PRN    metoclopramide (Reglan) 5 mg/mL injection 10 mg  10 mg Intravenous Q8H PRN    insulin aspart (NovoLOG) 100 Units/mL FlexPen 1-5 Units  1-5 Units Subcutaneous TID CC     Medications Prior to Admission   Medication Sig    Multiple Vitamins-Minerals (CENTRUM SILVER ADULT 50+) Oral Tab Take 1 tablet by mouth  daily.    Calcium Carbonate-Vitamin D (CALTRATE 600+D OR) Take 1 tablet by mouth daily.    Omega-3 Fatty Acids (FISH OIL OR) Take 1 capsule by mouth 2 (two) times daily.    POLYETHYLENE GLYCOL 3350 17 g Oral Powd Pack DISSOLVE CONTENTS OF 1 PACKET IN LIQUID AND DRINK DAILY AS NEEDED    METFORMIN 500 MG Oral Tab TAKE 1 TABLET(500 MG) BY MOUTH TWICE DAILY    PIOGLITAZONE 30 MG Oral Tab TAKE 1 TABLET(30 MG) BY MOUTH DAILY    FLUTICASONE PROPIONATE 50 MCG/ACT Nasal Suspension SHAKE LIQUID AND USE 1 TO 2 SPRAYS IN EACH NOSTRIL DAILY    atorvastatin 20 MG Oral Tab Take 1 tablet (20 mg total) by mouth every evening.    Dulaglutide (TRULICITY) 0.75 MG/0.5ML Subcutaneous Solution Pen-injector Inject 0.75 mg into the skin once a week.    OneTouch Delica Lancets 33G Does not apply Misc 1 each by Does not apply route daily.    Glucose Blood (ONETOUCH VERIO) In Vitro Strip TEST DAILY    ONETOUCH DELICA LANCETS FINE Does not apply Misc TEST DAILY    OCUVITE Oral Tab Take 1 tablet by mouth daily.    aspirin 81 MG Oral Tab Take 1 tablet (81 mg total) by mouth daily.    DiphenhydrAMINE HCl 25 MG Oral Tab Take 1 tablet (25 mg total) by mouth 2 (two) times daily.       Allergies  No Known Allergies    Review of Systems:   As by Admitting/Attending    Results:   Laboratory Data:  Lab Results   Component Value Date    WBC 10.9 09/25/2024    HGB 13.8 09/25/2024    HCT 39.5 09/25/2024    .0 09/25/2024    CREATSERUM 0.85 09/25/2024    BUN 16 09/25/2024     09/25/2024    K 3.7 09/25/2024     09/25/2024    CO2 24.0 09/25/2024     (H) 09/25/2024    CA 9.0 09/25/2024    ALB 4.5 09/24/2024    ALKPHO 68 09/24/2024    TP 6.8 09/24/2024    AST 23 09/24/2024    ALT 27 09/24/2024    T4F 1.2 08/12/2022    TSH 3.078 09/24/2024    PSA 0.40 08/12/2022    ETOH <3 09/24/2024         Imaging:  XR CHEST AP PORTABLE  (CPT=71045)    Result Date: 9/24/2024  CONCLUSION:   Negative for radiographically evident acute intrathoracic  process.   elm-remote  Dictated by (CST): Darwin Colindres MD on 9/24/2024 at 4:12 PM     Finalized by (CST): Darwin Colindres MD on 9/24/2024 at 4:13 PM          CT BRAIN OR HEAD (CPT=70450)    Result Date: 9/24/2024  CONCLUSION:   No acute intracranial abnormality.  Mild chronic microvascular white matter ischemia.    Dictated by (CST): Blaise Patel MD on 9/24/2024 at 3:32 PM     Finalized by (CST): Blaise Patel MD on 9/24/2024 at 3:35 PM           Vital Signs:   Blood pressure 117/70, pulse 59, temperature 98.7 °F (37.1 °C), temperature source Oral, resp. rate 18, height 75\", weight 94.4 kg (208 lb 1.6 oz), SpO2 95%.    Mental Status Exam:   Appearance: Stated age male, in hospital gown, laying down in hospital bed. Patient is appropriately groomed and appears comfortable.  Psychomotor: Patient demonstrating no restlessness and mild agitation.  No bizarre behaviors observed  Orientation: Alert and oriented to person, place, and event.   Gait: Not evaluated.  Attitude/Coorperation: Patient was cooperative and attentive.  Behavior: No bizarre behavior observed.  Speech: Normal rate and rhythm, not pressured speech   Mood: Anxious, worried about the future   Affect: Affect congruent with mood    Thought process: Disorganized, with some flight of ideas  Thought content: Patient denies any suicidal or homicidal ideation.  Perceptions: Patient denies auditory or visual hallucinations   Concentration: Grossly impaired  Memory: Grossly intact   Intellect: Average.  Judgment and Insight: Questionable as evidenced by fragility and lack of insight on current situation     Impression:     Generalized anxiety disorder with panic attacks.  Rule out schizoid Personality Disorder.  Altered mental status, unspecified altered mental status type  Hypertension, unspecified type    Patient is a 73 year old   male with past medical history of HTN, HL, and T2DM who was admitted to the hospital for Altered  mental status, unspecified altered mental status type: The patient has been demonstrating confusion and bizarre behaviors. Patient indicated for psych consult for evaluation and advise.    9/25/2024: The patient has been demonstrating excessive anxiety, impairment in his ability to cope with distress, peculiar thought process and personality quality.    Discussed risk and benefit, acknowledging the current symptom and severity.  At this point, I would recommend the following approach:     Focus on safety. Continue safety precautions.   Focus on education and support.  Focus on insight orientation helping the patient understand diagnosis and treatment plan.  Start Lexapro 5 mg nightly will  Start Zyprexa 2.5 mg nightly  Processed with patient at length, the initiation of the above psychotropic medications I advised the patient of the risks, benefits, alternatives and potential side effects. The patient consents to administration of the medications and understands the right to refuse medications at any time. The patient verbalized understanding.   Coordinate plan with team.      Orders This Visit:  Orders Placed This Encounter   Procedures    CBC With Differential With Platelet    Comp Metabolic Panel (14)    Ethyl Alcohol    TSH W Reflex To Free T4    Urinalysis with Culture Reflex    Basic Metabolic Panel (8)    CBC, Platelet; No Differential    Hemoglobin A1C    Drug screen 8 w/out confirmation, urine    Rapid SARS-CoV-2 by PCR       Meds This Visit:  Requested Prescriptions      No prescriptions requested or ordered in this encounter       Andrew Castillo MD  9/25/2024    Note to Patient: The 21st Century Cures Act makes medical notes like these available to patients in the interest of transparency. However, be advised this is a medical document. It is intended as peer to peer communication. It is written in medical language and may contain abbreviations or verbiage that are unfamiliar. It may appear blunt or  direct. Medical documents are intended to carry relevant information, facts as evident, and the clinical opinion of the practitioner. This note may have been transcribed using a voice dictation system. Voice recognition errors may occur. This should not be taken to alter the content or meaning of this note.

## 2024-09-25 NOTE — PROGRESS NOTES
[facility] Hospitalist Progress Note     CC: Hospital Follow up    PCP: Anthony Singh MD       Assessment/Plan:     Principal Problem:    Altered mental status, unspecified altered mental status type  Active Problems:    Hypertension, unspecified type    Mr. Boswell is a 74 yo M with PMH of HTN, HL, DM2, who presented with confusion.     AMS  - unclear baseline, no known hx of psychiatric disorders, patient states everything snapped when he got a $140 water bill today and he couldn't do it anymore, no SI/HI  - no focal neuro deficits  - CT head negative  - will consult psychiatry  - check UDS     Elevated  HTN  - BP elevated in ED, denies hs of HTN  - given amlodipine in ED  - will start lisinopril given DM2     DM2  - home regimen: previously on metformin and pioglitazone- currently off all meds as they ran out and he has not seen PCP in over 3 years  - accuchecks QID, hypoglycemic protocol, SSI  - check HgA1c     HL  - previously on statin     Dental cares/dental fracture  - dental XR, rule out abscess    Glaucoma  - f/u ophtho     Social  - lives alone, states house is falling apart and he isn't able to afford to live in the home anymore  - will have SW consult to discuss options     FN:  - IVF: NS  - Diet: diabetic     DVT Prophy: SCD  Lines: PIV     Dispo: pending clinical course     Outpatient records or previous hospital records reviewed.      Further recommendations pending patient's clinical course.  [facility] hospitalist to continue to follow patient while in house     Patient and/or patient's family given opportunity to ask questions and note understanding and agreeing with therapeutic plan as outlined     Geraldine Escalera MD  [facility] Hospitalist  Answering Service number: [phone]     Subjective:     Feels ok, still feels \"shocky\"     OBJECTIVE:    Blood pressure 117/70, pulse 59, temperature 98.7 °F (37.1 °C), temperature source Oral, resp. rate 18, height 6' 3\" (1.905 m), weight 208 lb 1.6 oz (94.4 kg), SpO2  95%.    Temp:  [97.7 °F (36.5 °C)-98.7 °F (37.1 °C)] 98.7 °F (37.1 °C)  Pulse:  [56-75] 59  Resp:  [16-18] 18  BP: (117-185)/() 117/70  SpO2:  [94 %-100 %] 95 %      Intake/Output:    Intake/Output Summary (Last 24 hours) at 9/25/2024 1400  Last data filed at 9/25/2024 0954  Gross per 24 hour   Intake 185 ml   Output --   Net 185 ml       Last 3 Weights   09/24/24 1800 208 lb 1.6 oz (94.4 kg)   09/24/24 1413 200 lb (90.7 kg)   10/05/21 1533 257 lb (116.6 kg)   06/15/21 1349 249 lb 12.8 oz (113.3 kg)       Exam  GEN: male in NAD, does not appear disheveled, well nourished  HEENT: EOMI  Pulm: CTAB, no crackles or wheezes  CV: RRR, no murmurs  ABD: Soft, non-tender, non-distended, +BS  SKIN: warm, dry  EXT: no edema    Data Review:       Labs:     Recent Labs   Lab 09/24/24  1410 09/25/24  0524   RBC 4.58 4.14   HGB 15.0 13.8   HCT 44.0 39.5   MCV 96.1 95.4   MCH 32.8 33.3   MCHC 34.1 34.9   RDW 13.0 12.8   NEPRELIM 3.52  --    WBC 7.1 10.9   .0 302.0         Recent Labs   Lab 09/24/24  1410 09/25/24  0524   * 114*   BUN 13 16   CREATSERUM 1.05 0.85   EGFRCR 75 92   CA 9.7 9.0    136   K 3.8 3.7    105   CO2 26.0 24.0       Recent Labs   Lab 09/24/24  1410   ALT 27   AST 23   ALB 4.5         Imaging:  XR CHEST AP PORTABLE  (CPT=71045)    Result Date: 9/24/2024  CONCLUSION:   Negative for radiographically evident acute intrathoracic process.   elm-remote  Dictated by (CST): Darwin Colindres MD on 9/24/2024 at 4:12 PM     Finalized by (CST): Darwin Colindres MD on 9/24/2024 at 4:13 PM          CT BRAIN OR HEAD (CPT=70450)    Result Date: 9/24/2024  CONCLUSION:   No acute intracranial abnormality.  Mild chronic microvascular white matter ischemia.    Dictated by (CST): Blaise Patel MD on 9/24/2024 at 3:32 PM     Finalized by (CST): Blaise Patel MD on 9/24/2024 at 3:35 PM             Meds:     INPATIENT MEDICATIONS    Scheduled Medications:      heparin, 5,000 Units, Q8H RONY  insulin  aspart, 1-5 Units, TID CC            Drips:       PRN Medications  ibuprofen, 400 mg, Q6H PRN  glucose, 15 g, Q15 Min PRN   Or  glucose, 15 g, Q15 Min PRN   Or  glucose-vitamin C, 4 tablet, Q15 Min PRN   Or  dextrose, 50 mL, Q15 Min PRN   Or  glucose, 30 g, Q15 Min PRN   Or  glucose, 30 g, Q15 Min PRN   Or  glucose-vitamin C, 8 tablet, Q15 Min PRN  acetaminophen, 500 mg, Q4H PRN  ondansetron, 4 mg, Q6H PRN  metoclopramide, 10 mg, Q8H PRN

## 2024-09-25 NOTE — SPIRITUAL CARE NOTE
Spiritual Care Visit Note    Patient Name: Wong Boswell Date of Spiritual Care Visit: 24   : 1950 Primary Dx: Altered mental status, unspecified altered mental status type       Referred By: Referral From: Nurse    Spiritual Care Taxonomy:    Intended Effects: Lessen someone's feelings of isolation    Methods: Offer support    Interventions: Acknowledge current situation    Visit Type/Summary:     - Spiritual Care: Responded to a request for spiritual care and assessed the patient for spiritual care needs. Attempted visit. Patient is unavailable. Left a Spiritual Care contact information card.  remains available as needed for follow up.Patient sleeping at time of visit.    Spiritual Care support can be requested via an Epic consult. For urgent/immediate needs, please contact the On Call  at: [place]: ext [phone]    Rev Zulma Oro MDiv

## 2024-09-25 NOTE — PLAN OF CARE
Problem: Patient Centered Care  Goal: Patient preferences are identified and integrated in the patient's plan of care  Description: Interventions:  - What would you like us to know as we care for you? Home alone  - Provide timely, complete, and accurate information to patient/family  - Incorporate patient and family knowledge, values, beliefs, and cultural backgrounds into the planning and delivery of care  - Encourage patient/family to participate in care and decision-making at the level they choose  - Honor patient and family perspectives and choices  Outcome: Progressing     Problem: Diabetes/Glucose Control  Goal: Glucose maintained within prescribed range  Description: INTERVENTIONS:  - Monitor Blood Glucose as ordered  - Assess for signs and symptoms of hyperglycemia and hypoglycemia  - Administer ordered medications to maintain glucose within target range  - Assess barriers to adequate nutritional intake and initiate nutrition consult as needed  - Instruct patient on self management of diabetes  Outcome: Progressing     Problem: Patient/Family Goals  Goal: Patient/Family Long Term Goal  Description: Patient's Long Term Goal: Get stable    Interventions:  - Follow healthcare team, treatment plan  -Monitor labs, Vitals & dianostics test  -Pain management.  - Diet recommendation.   -Partipate in therapy.  -Discharge planning    - See additional Care Plan goals for specific interventions  Outcome: Progressing  Goal: Patient/Family Short Term Goal  Description: Patient's Short Term Goal: Get better    Interventions:   - Monitor labs, vitals and diagnostic tests.  -Pain management, pharmacological interventions  -Diet recommendations  -Adequate oral intake     - See additional Care Plan goals for specific interventions  Outcome: Progressing

## 2024-09-25 NOTE — PLAN OF CARE
Admitted from ED,AxOx4, on RA, no complain of pain, vitals done, IV flushed and saline lock, all needs completed in the room, call light within reach.     Problem: Patient Centered Care  Goal: Patient preferences are identified and integrated in the patient's plan of care  Description: Interventions:  - What would you like us to know as we care for you? Home alone  - Provide timely, complete, and accurate information to patient/family  - Incorporate patient and family knowledge, values, beliefs, and cultural backgrounds into the planning and delivery of care  - Encourage patient/family to participate in care and decision-making at the level they choose  - Honor patient and family perspectives and choices  Outcome: Progressing     Problem: Diabetes/Glucose Control  Goal: Glucose maintained within prescribed range  Description: INTERVENTIONS:  - Monitor Blood Glucose as ordered  - Assess for signs and symptoms of hyperglycemia and hypoglycemia  - Administer ordered medications to maintain glucose within target range  - Assess barriers to adequate nutritional intake and initiate nutrition consult as needed  - Instruct patient on self management of diabetes  Outcome: Progressing     Problem: Patient/Family Goals  Goal: Patient/Family Long Term Goal  Description: Patient's Long Term Goal: Back to home    Interventions:  - Follow healthcare team, treatment plan  -Monitor labs, Vitals & dianostics test  -Pain management.  - Diet recommendation.   -Partipate in therapy.  -Discharge planning    - See additional Care Plan goals for specific interventions  Outcome: Progressing  Goal: Patient/Family Short Term Goal  Description: Patient's Short Term Goal: Get better    Interventions:   - Monitor labs, vitals and diagnostic tests.  -Pain management, pharmacological interventions  -Diet recommendations  -Adequate oral intake     - See additional Care Plan goals for specific interventions  Outcome: Progressing

## 2024-09-25 NOTE — SPIRITUAL CARE NOTE
Spiritual Care Visit Note    Patient Name: Wong Boswell Date of Spiritual Care Visit: 24   : 1950 Primary Dx: Altered mental status, unspecified altered mental status type       Referred By: Referral From: , Dre Request    Spiritual Care Taxonomy:    Intended Effects: Aligning care plan with patient's values    Methods: Explore mela and values    Interventions: Acknowledge current situation;Ask guided questions about cultural and Latter day values;Connect someone with their mela community/clergy;Communicate patient's needs/concerns to others    Visit Type/Summary:     received referral for visit from renoion request. Writer visited with patient, RN present in room, confirmed request. Patient indicated they are Jainism.      - Spiritual Care: Responded to a request via the on call phone Consulted with RN prior to visit. Offered empathic listening and emotional support. Provided support for Patient's spiritual/Latter day requests. Coordinated Communion and verified NPO status.  remains available for follow up.    Spiritual Care support can be requested via an Epic consult. For urgent/immediate needs, please contact the On Call  at: [place]: ext [phone]    Chaplain Vladislav.

## 2024-09-26 LAB
GLUCOSE BLDC GLUCOMTR-MCNC: 146 MG/DL (ref 70–99)
GLUCOSE BLDC GLUCOMTR-MCNC: 162 MG/DL (ref 70–99)
GLUCOSE BLDC GLUCOMTR-MCNC: 167 MG/DL (ref 70–99)
GLUCOSE BLDC GLUCOMTR-MCNC: 168 MG/DL (ref 70–99)

## 2024-09-26 PROCEDURE — 99233 SBSQ HOSP IP/OBS HIGH 50: CPT | Performed by: OTHER

## 2024-09-26 NOTE — PLAN OF CARE
Problem: Patient Centered Care  Goal: Patient preferences are identified and integrated in the patient's plan of care  Description: Interventions:  - What would you like us to know as we care for you? Lives alone  - Provide timely, complete, and accurate information to patient/family  - Incorporate patient and family knowledge, values, beliefs, and cultural backgrounds into the planning and delivery of care  - Encourage patient/family to participate in care and decision-making at the level they choose  - Honor patient and family perspectives and choices  Outcome: Progressing     Problem: Diabetes/Glucose Control  Goal: Glucose maintained within prescribed range  Description: INTERVENTIONS:  - Monitor Blood Glucose as ordered  - Assess for signs and symptoms of hyperglycemia and hypoglycemia  - Administer ordered medications to maintain glucose within target range  - Assess barriers to adequate nutritional intake and initiate nutrition consult as needed  - Instruct patient on self management of diabetes  Outcome: Progressing     Problem: Patient/Family Goals  Goal: Patient/Family Long Term Goal  Description: Patient's Long Term Goal: long term care placement    Interventions:  - Monitor vital signs  - Monitor appropriate labs  - Monitor blood glucose levels  - Administer medications per order  - Pain management as needed  - Follow MD orders  - Diagnostics per orders  - Update / inform patient and family on plan of care  - Discharge planning  - See additional Care Plan goals for specific interventions  Outcome: Progressing  Goal: Patient/Family Short Term Goal  Description: Patient's Short Term Goal: feel better    Interventions:   - Monitor vital signs  - Monitor appropriate labs  - Monitor blood glucose levels  - Administer medications per order  - Pain management as needed  - Follow MD orders  - Diagnostics per orders  - Update / inform patient and family on plan of care  - Discharge planning  - See additional  Care Plan goals for specific interventions  Outcome: Progressing

## 2024-09-26 NOTE — PLAN OF CARE
Pt is aox3, resting in bed, bed is low and locked in place, call light within reach.   Problem: Patient Centered Care  Goal: Patient preferences are identified and integrated in the patient's plan of care  Description: Interventions:  - What would you like us to know as we care for you? Lives alone  - Provide timely, complete, and accurate information to patient/family  - Incorporate patient and family knowledge, values, beliefs, and cultural backgrounds into the planning and delivery of care  - Encourage patient/family to participate in care and decision-making at the level they choose  - Honor patient and family perspectives and choices  Outcome: Progressing     Problem: Diabetes/Glucose Control  Goal: Glucose maintained within prescribed range  Description: INTERVENTIONS:  - Monitor Blood Glucose as ordered  - Assess for signs and symptoms of hyperglycemia and hypoglycemia  - Administer ordered medications to maintain glucose within target range  - Assess barriers to adequate nutritional intake and initiate nutrition consult as needed  - Instruct patient on self management of diabetes  Outcome: Progressing     Problem: Patient/Family Goals  Goal: Patient/Family Long Term Goal  Description: Patient's Long Term Goal: long term care placement    Interventions:  - Monitor vital signs  - Monitor appropriate labs  - Monitor blood glucose levels  - Administer medications per order  - Pain management as needed  - Follow MD orders  - Diagnostics per orders  - Update / inform patient and family on plan of care  - Discharge planning  - See additional Care Plan goals for specific interventions  Outcome: Progressing  Goal: Patient/Family Short Term Goal  Description: Patient's Short Term Goal: feel better    Interventions:   - Monitor vital signs  - Monitor appropriate labs  - Monitor blood glucose levels  - Administer medications per order  - Pain management as needed  - Follow MD orders  - Diagnostics per orders  - Update /  inform patient and family on plan of care  - Discharge planning  - See additional Care Plan goals for specific interventions  Outcome: Progressing

## 2024-09-26 NOTE — CM/SW NOTE
SW followed up on discharge planning.    SW met with patient bedside to discuss LTC.    Patient stated he is in shock.  SW asked if patient feels his home is safe to which he said no.  Patient agrees he cannot go back to his home.    Patient agreeable to move forward with [facility]- SW explained he will not have utility bills/living expense but will have to turn in his SS check ($991).  Patient v/u.    Patient worried about cleaning out his home- SW stated he will have to work with his sister (co-owner of home) on hiring junk removal service as we cannot safely send him back there.  Patient v/u.    Patient worried about credit card debt- $6,000 on mostly utility bills.  SW stated do not worry about this as if no one lives in the home (sister in [place]) and it is in disrepair it will be condemned by city.    CARI rsvd [facility] in [employer] and sent message to Paula madrigal/[facility] to confirm patient preference.    PLAN: DC to [facility] pending med clear/financial clearance    / to remain available for support and/or discharge planning.     Zainab Jasmine MSW, LSW [id number]

## 2024-09-26 NOTE — PROGRESS NOTES
Patient is a 73 year old   male lives on his own with past medical history of HTN, HL, and T2DM who was admitted to the hospital for Altered mental status, unspecified altered mental status type: The patient has been demonstrating confusion and bizarre behaviors. Patient indicated for psych consult for evaluation and advise.    Consult Duration      The patient seen for over 50-minute, follow-up evaluation, over 50% counseling and coordinating care addressing altered mental status.     Record reviewed, communication with attending, communication with RN and patient seen face to face evaluation.     History of Present Illness:   Per chart review, the patient presented to the hospital on 9/24/24 with altered mental status.     Psychotropic medications received: lexapro 5mg nightly , Zyprexa 2.5mg,     Labs and imaging reviewed: Glucose 148, 187, 262 162. No additional 24 hour labs.    Vital signs: 120/81, HR 57, resp 16, temp 97.5    The patient seen today in hospital gown laying in bed.     The patient reporting that he was able to sleep for the first time on the current medication.  Patient is very anxious and always have multiple question and worry about disposition.    Otherwise the patient is alert and oriented cooperative.    Pt reported that \"things got crazy yesterday\" after talking with his estranged sister. They discussed what would happen to the old house the co-own and he expressed he feels he has no place to live.     Pt has been exploring [facility] community options and is interested in that idea.     Pt continues to feel \"shocky, numb, and dazed\". He is going to try to focus on one day at a time and remain present.     The patient denies auditory or visual hallucinations.  The patient denies suicidal or homicidal ideation.    The patient has been demonstrating odd, bizarre, concerning behaviors with paranoia, delusional ideations and severe anxiety with difficulty tolerating  stress.    He acknowledging he cannot care for himself and he can do the old house he needs someone to care for him. We dissussed the option of a jail community after discharge and he agees to communicate with social work to explore.     Past Psychiatric/Medication History:  1. Prior diagnoses: No official diagnosis or treatment  2. Past psychiatric inpatient: unknown  3. Past outpatient history: \"some in college\"   4. Past suicide history: unknown  5. Medication history: unknown    Social History:   Pt lives alone and is . He has no children. He lives in a very old home that his a great concern to him.      Family History:  Not able to obtain   Medical History:   Past Medical History  Past Medical History:    Arthritis    Dyslipidemia associated with type 2 diabetes mellitus (HCC)    Essential hypertension    Kidney stone    Subclinical hypothyroidism    Type 2 diabetes mellitus (HCC)       Past Surgical History  Past Surgical History:   Procedure Laterality Date    Elbow surgery Left 1962    Dislocation    Incision and drainage  08/2016    Abscess back    Incision and drainage  03/13/2018    Abscess back    Knee surgery Left 1975    Meniscal tear    Skin surgery N/A 05/18/2018    Excision epidermal inclusion cyst, midback       Family History  Family History   Problem Relation Age of Onset    Heart Disease Father         Details unknown    Diabetes Father     Hypertension Father     Diabetes Mother     Hypertension Mother     Other (CHF) Mother     Diabetes Paternal Grandfather     Diabetes Brother     Hypertension Brother     Glaucoma Neg        Social History  Social History     Socioeconomic History    Marital status:     Number of children: 0   Occupational History    Occupation: computer software,      Comment: retired   Tobacco Use    Smoking status: Every Day     Current packs/day: 0.50     Average packs/day: 0.5 packs/day for 26.0 years (13.0 ttl pk-yrs)     Types: Cigarettes     Smokeless tobacco: Never    Tobacco comments:     less than half a pack a day   Vaping Use    Vaping status: Never Used   Substance and Sexual Activity    Alcohol use: No     Alcohol/week: 0.0 standard drinks of alcohol    Drug use: No   Other Topics Concern    Pt has a pacemaker No    Pt has a defibrillator No    Reaction to local anesthetic No     Social Determinants of Health     Food Insecurity: Food Insecurity Present (9/24/2024)    Food Insecurity     Food Insecurity: Sometimes true   Transportation Needs: Unmet Transportation Needs (9/24/2024)    Transportation Needs     Lack of Transportation: Yes   Housing Stability: Low Risk  (9/24/2024)    Housing Stability     Housing Instability: No           Current Medications:  Current Facility-Administered Medications   Medication Dose Route Frequency    ibuprofen (Motrin) tab 400 mg  400 mg Oral Q6H PRN    escitalopram (Lexapro) tab 5 mg  5 mg Oral Nightly    OLANZapine (ZyPREXA) tab 2.5 mg  2.5 mg Oral Nightly    glucose (Dex4) 15 GM/59ML oral liquid 15 g  15 g Oral Q15 Min PRN    Or    glucose (Glutose) 40% oral gel 15 g  15 g Oral Q15 Min PRN    Or    glucose-vitamin C (Dex-4) chewable tab 4 tablet  4 tablet Oral Q15 Min PRN    Or    dextrose 50% injection 50 mL  50 mL Intravenous Q15 Min PRN    Or    glucose (Dex4) 15 GM/59ML oral liquid 30 g  30 g Oral Q15 Min PRN    Or    glucose (Glutose) 40% oral gel 30 g  30 g Oral Q15 Min PRN    Or    glucose-vitamin C (Dex-4) chewable tab 8 tablet  8 tablet Oral Q15 Min PRN    heparin (Porcine) 5000 UNIT/ML injection 5,000 Units  5,000 Units Subcutaneous Q8H RONY    acetaminophen (Tylenol Extra Strength) tab 500 mg  500 mg Oral Q4H PRN    ondansetron (Zofran) 4 MG/2ML injection 4 mg  4 mg Intravenous Q6H PRN    insulin aspart (NovoLOG) 100 Units/mL FlexPen 1-5 Units  1-5 Units Subcutaneous TID CC     Medications Prior to Admission   Medication Sig    Multiple Vitamins-Minerals (CENTRUM SILVER ADULT 50+) Oral Tab Take 1  tablet by mouth daily.    Calcium Carbonate-Vitamin D (CALTRATE 600+D OR) Take 1 tablet by mouth daily.    Omega-3 Fatty Acids (FISH OIL OR) Take 1 capsule by mouth 2 (two) times daily.    POLYETHYLENE GLYCOL 3350 17 g Oral Powd Pack DISSOLVE CONTENTS OF 1 PACKET IN LIQUID AND DRINK DAILY AS NEEDED    METFORMIN 500 MG Oral Tab TAKE 1 TABLET(500 MG) BY MOUTH TWICE DAILY    PIOGLITAZONE 30 MG Oral Tab TAKE 1 TABLET(30 MG) BY MOUTH DAILY    FLUTICASONE PROPIONATE 50 MCG/ACT Nasal Suspension SHAKE LIQUID AND USE 1 TO 2 SPRAYS IN EACH NOSTRIL DAILY    atorvastatin 20 MG Oral Tab Take 1 tablet (20 mg total) by mouth every evening.    Dulaglutide (TRULICITY) 0.75 MG/0.5ML Subcutaneous Solution Pen-injector Inject 0.75 mg into the skin once a week.    OneTouch Delica Lancets 33G Does not apply Misc 1 each by Does not apply route daily.    Glucose Blood (ONETOUCH VERIO) In Vitro Strip TEST DAILY    ONETOUCH DELICA LANCETS FINE Does not apply Misc TEST DAILY    OCUVITE Oral Tab Take 1 tablet by mouth daily.    aspirin 81 MG Oral Tab Take 1 tablet (81 mg total) by mouth daily.    DiphenhydrAMINE HCl 25 MG Oral Tab Take 1 tablet (25 mg total) by mouth 2 (two) times daily.       Allergies  No Known Allergies    Review of Systems:   As by Admitting/Attending    Results:   Laboratory Data:  Lab Results   Component Value Date    WBC 10.9 09/25/2024    HGB 13.8 09/25/2024    HCT 39.5 09/25/2024    .0 09/25/2024    CREATSERUM 0.85 09/25/2024    BUN 16 09/25/2024     09/25/2024    K 3.7 09/25/2024     09/25/2024    CO2 24.0 09/25/2024     (H) 09/25/2024    CA 9.0 09/25/2024    ALB 4.5 09/24/2024    ALKPHO 68 09/24/2024    TP 6.8 09/24/2024    AST 23 09/24/2024    ALT 27 09/24/2024    T4F 1.2 08/12/2022    TSH 3.078 09/24/2024    PSA 0.40 08/12/2022    ETOH <3 09/24/2024         Imaging:  XR CHEST AP PORTABLE  (CPT=71045)    Result Date: 9/24/2024  CONCLUSION:   Negative for radiographically evident acute  intrathoracic process.   elm-remote  Dictated by (CST): Darwin Colindres MD on 9/24/2024 at 4:12 PM     Finalized by (CST): Darwin Colindres MD on 9/24/2024 at 4:13 PM          CT BRAIN OR HEAD (CPT=70450)    Result Date: 9/24/2024  CONCLUSION:   No acute intracranial abnormality.  Mild chronic microvascular white matter ischemia.    Dictated by (CST): Blaise Patel MD on 9/24/2024 at 3:32 PM     Finalized by (CST): Blaise Patel MD on 9/24/2024 at 3:35 PM           Vital Signs:   Blood pressure 120/81, pulse 57, temperature 97.5 °F (36.4 °C), temperature source Oral, resp. rate 16, height 75\", weight 94.4 kg (208 lb 1.6 oz), SpO2 95%.    Mental Status Exam:   Appearance: Stated age male, in hospital gown, laying down in hospital bed. Patient is appropriately groomed and appears comfortable.  Psychomotor: Patient demonstrating no restlessness and mild agitation.  No bizarre behaviors observed  Orientation: Alert and oriented to person, place, and event.   Gait: Not evaluated.  Attitude/Coorperation: Patient was cooperative and attentive.  Behavior: No bizarre behavior observed.  Speech: Normal rate and rhythm, not pressured speech   Mood: Anxious, worried about the future   Affect: Anxious and restricted, congruent with mood    Thought process: Disorganized, with some flight of ideas  Thought content: Patient denies any suicidal or homicidal ideation.  Perceptions: Patient denies auditory or visual hallucinations   Concentration: Grossly impaired, ruminates on the house and the future   Memory: Grossly intact   Intellect: Average.  Judgment and Insight: Questionable as evidenced by fragility and lack of insight on current situation     Impression:     Generalized anxiety disorder with panic attacks.  Rule out schizoid Personality Disorder.  Altered mental status, unspecified altered mental status type  Hypertension, unspecified type    Patient is a 73 year old   male with past medical history  of HTN, HL, and T2DM who was admitted to the hospital for Altered mental status, unspecified altered mental status type: The patient has been demonstrating confusion and bizarre behaviors. Patient indicated for psych consult for evaluation and advise.    9/25/2024: The patient has been demonstrating excessive anxiety, impairment in his ability to cope with distress, peculiar thought process and personality quality.    9/26/2024: The patient continued to express anxiety and rumminating thoughts about his living situation. We discussed the option of a longterm community. Pt has spoken to his estranged sister yesterday and will work on the future of the old home they co-own. Took meds overnight and slept \"a few hours\".     Discussed risk and benefit, acknowledging the current symptom and severity.  At this point, I would recommend the following approach:     Focus on safety. Continue safety precautions.   Focus on education and support.  Focus on insight orientation helping the patient understand diagnosis and treatment plan.  Continue Lexapro 5 mg nightly will  Continue Zyprexa 2.5 mg nightly  Processed with patient at length, the initiation of the above psychotropic medications I advised the patient of the risks, benefits, alternatives and potential side effects. The patient consents to administration of the medications and understands the right to refuse medications at any time. The patient verbalized understanding.   Coordinate plan with team.      Orders This Visit:  Orders Placed This Encounter   Procedures    CBC With Differential With Platelet    Comp Metabolic Panel (14)    Ethyl Alcohol    TSH W Reflex To Free T4    Urinalysis with Culture Reflex    Basic Metabolic Panel (8)    CBC, Platelet; No Differential    Hemoglobin A1C    Drug screen 8 w/out confirmation, urine    Rapid SARS-CoV-2 by PCR       Meds This Visit:  Requested Prescriptions      No prescriptions requested or ordered in this encounter        Andrew Castillo MD  9/26/2024    Note to Patient: The 21st Century Cures Act makes medical notes like these available to patients in the interest of transparency. However, be advised this is a medical document. It is intended as peer to peer communication. It is written in medical language and may contain abbreviations or verbiage that are unfamiliar. It may appear blunt or direct. Medical documents are intended to carry relevant information, facts as evident, and the clinical opinion of the practitioner. This note may have been transcribed using a voice dictation system. Voice recognition errors may occur. This should not be taken to alter the content or meaning of this note.

## 2024-09-26 NOTE — SPIRITUAL CARE NOTE
Spiritual Care Visit Note    Patient Name: Wong Boswell Date of Spiritual Care Visit: 24   : 1950 Primary Dx: Altered mental status, unspecified altered mental status type       Referred By: Referral From:     Spiritual Care Taxonomy:    Intended Effects: Aligning care plan with patient's values    Methods: Collaborate with care team member;Offer spiritual/Protestant support;Encourage sharing of feelings    Interventions: Active listening;Ask guided questions;Conduct a Protestant service;[place];Provide compassionate touch    Visit Type/Summary:     - Spiritual Care: Consulted with RN prior to visit. Offered empathic listening and emotional support. Patient and family expressed appreciation for  visit. Provided information regarding how to contact Spiritual Care and left a Spiritual Care information card. Provided support for Patient's spiritual/Protestant requests. Provided Communion and verified NPO status. Offered prayer.    MIKE MitchellII   [id number]     Spiritual Care support can be requested via an Epic consult. For urgent/immediate needs, please contact the On Call  at: [place]: ext [phone]

## 2024-09-26 NOTE — PROGRESS NOTES
[facility] Hospitalist Progress Note     CC: Hospital Follow up    PCP: Anthony Singh MD       Assessment/Plan:     Principal Problem:    Altered mental status, unspecified altered mental status type  Active Problems:    Hypertension, unspecified type    Generalized anxiety disorder with panic attacks    Mr. Boswell is a 72 yo M with PMH of HTN, HL, DM2, who presented with confusion.     AMS  - unclear baseline, no known hx of psychiatric disorders, patient states everything snapped when he got a $140 water bill today and he couldn't do it anymore, no SI/HI  - no focal neuro deficits  - CT head negative  - will consult psychiatry  - check UDS     Elevated  HTN  - BP elevated in ED, denies hs of HTN  - given amlodipine in ED  -Was going to start medications scheduled but his blood pressure is markedly improved, monitor     DM2  - home regimen: previously on metformin and pioglitazone- currently off all meds as they ran out and he has not seen PCP in over 3 years  - accuchecks QID, hypoglycemic protocol, SSI  - check HgA1c     HL  - previously on statin     Dental cares/dental fracture  - dental XR, rule out abscess    Glaucoma  - f/u ophtho     Social  - lives alone, states house is falling apart and he isn't able to afford to live in the home anymore  - will have SW consult to discuss options     FN:  - IVF: NS  - Diet: diabetic     DVT Prophy: SCD  Lines: PIV     Dispo: pending clinical course     Outpatient records or previous hospital records reviewed.      Further recommendations pending patient's clinical course.  [facility] hospitalist to continue to follow patient while in house     Patient and/or patient's family given opportunity to ask questions and note understanding and agreeing with therapeutic plan as outlined    Note: This chart was prepared using voice recognition software and may contain unintended word substitution errors.        Patient is medically stable for transfer once arranged    Lillian Patel  MD  Hospitalist  [facility]   Answering service: [phone]     Subjective:   Feeling less shocky than before    OBJECTIVE:    Blood pressure 120/81, pulse 57, temperature 97.5 °F (36.4 °C), temperature source Oral, resp. rate 16, height 6' 3\" (1.905 m), weight 208 lb 1.6 oz (94.4 kg), SpO2 95%.    Temp:  [97.5 °F (36.4 °C)-98.4 °F (36.9 °C)] 97.5 °F (36.4 °C)  Pulse:  [54-65] 57  Resp:  [16-18] 16  BP: ()/(66-88) 120/81  SpO2:  [95 %-97 %] 95 %      Intake/Output:    Intake/Output Summary (Last 24 hours) at 9/26/2024 1523  Last data filed at 9/26/2024 0900  Gross per 24 hour   Intake 220 ml   Output --   Net 220 ml       Last 3 Weights   09/24/24 1800 208 lb 1.6 oz (94.4 kg)   09/24/24 1413 200 lb (90.7 kg)   10/05/21 1533 257 lb (116.6 kg)   06/15/21 1349 249 lb 12.8 oz (113.3 kg)       Exam  GEN: male in NAD, does not appear disheveled, well nourished  HEENT: EOMI  Pulm: CTAB, no crackles or wheezes  CV: RRR, no murmurs  ABD: Soft, non-tender, non-distended, +BS  SKIN: warm, dry  EXT: no edema    Data Review:       Labs:     Recent Labs   Lab 09/24/24  1410 09/25/24  0524   RBC 4.58 4.14   HGB 15.0 13.8   HCT 44.0 39.5   MCV 96.1 95.4   MCH 32.8 33.3   MCHC 34.1 34.9   RDW 13.0 12.8   NEPRELIM 3.52  --    WBC 7.1 10.9   .0 302.0         Recent Labs   Lab 09/24/24  1410 09/25/24  0524   * 114*   BUN 13 16   CREATSERUM 1.05 0.85   EGFRCR 75 92   CA 9.7 9.0    136   K 3.8 3.7    105   CO2 26.0 24.0       Recent Labs   Lab 09/24/24  1410   ALT 27   AST 23   ALB 4.5         Imaging:  XR CHEST AP PORTABLE  (CPT=71045)    Result Date: 9/24/2024  CONCLUSION:   Negative for radiographically evident acute intrathoracic process.   elm-remote  Dictated by (CST): Darwin Colindres MD on 9/24/2024 at 4:12 PM     Finalized by (CST): Darwin Colindres MD on 9/24/2024 at 4:13 PM          CT BRAIN OR HEAD (CPT=70450)    Result Date: 9/24/2024  CONCLUSION:   No acute intracranial abnormality.   Mild chronic microvascular white matter ischemia.    Dictated by (CST): Blaise Patel MD on 9/24/2024 at 3:32 PM     Finalized by (CST): Blaise Patel MD on 9/24/2024 at 3:35 PM             Meds:     INPATIENT MEDICATIONS    Scheduled Medications:      escitalopram, 5 mg, Nightly  OLANZapine, 2.5 mg, Nightly  heparin, 5,000 Units, Q8H RONY  insulin aspart, 1-5 Units, TID CC            Drips:       PRN Medications  ibuprofen, 400 mg, Q6H PRN  glucose, 15 g, Q15 Min PRN   Or  glucose, 15 g, Q15 Min PRN   Or  glucose-vitamin C, 4 tablet, Q15 Min PRN   Or  dextrose, 50 mL, Q15 Min PRN   Or  glucose, 30 g, Q15 Min PRN   Or  glucose, 30 g, Q15 Min PRN   Or  glucose-vitamin C, 8 tablet, Q15 Min PRN  acetaminophen, 500 mg, Q4H PRN  ondansetron, 4 mg, Q6H PRN

## 2024-09-27 VITALS
HEIGHT: 75 IN | SYSTOLIC BLOOD PRESSURE: 128 MMHG | DIASTOLIC BLOOD PRESSURE: 85 MMHG | RESPIRATION RATE: 16 BRPM | HEART RATE: 59 BPM | BODY MASS INDEX: 25.88 KG/M2 | WEIGHT: 208.13 LBS | TEMPERATURE: 97 F | OXYGEN SATURATION: 97 %

## 2024-09-27 LAB
GLUCOSE BLDC GLUCOMTR-MCNC: 158 MG/DL (ref 70–99)
GLUCOSE BLDC GLUCOMTR-MCNC: 181 MG/DL (ref 70–99)

## 2024-09-27 PROCEDURE — 99233 SBSQ HOSP IP/OBS HIGH 50: CPT | Performed by: OTHER

## 2024-09-27 RX ORDER — ESCITALOPRAM OXALATE 5 MG/1
5 TABLET ORAL NIGHTLY
Qty: 30 TABLET | Refills: 0 | Status: SHIPPED | OUTPATIENT
Start: 2024-09-27

## 2024-09-27 RX ORDER — OLANZAPINE 2.5 MG/1
2.5 TABLET, FILM COATED ORAL NIGHTLY
Qty: 30 TABLET | Refills: 0 | Status: SHIPPED | OUTPATIENT
Start: 2024-09-27

## 2024-09-27 NOTE — PLAN OF CARE
Problem: Patient Centered Care  Goal: Patient preferences are identified and integrated in the patient's plan of care  Description: Interventions:  - What would you like us to know as we care for you? Lives alone  - Provide timely, complete, and accurate information to patient/family  - Incorporate patient and family knowledge, values, beliefs, and cultural backgrounds into the planning and delivery of care  - Encourage patient/family to participate in care and decision-making at the level they choose  - Honor patient and family perspectives and choices  Outcome: Progressing     Problem: Diabetes/Glucose Control  Goal: Glucose maintained within prescribed range  Description: INTERVENTIONS:  - Monitor Blood Glucose as ordered  - Assess for signs and symptoms of hyperglycemia and hypoglycemia  - Administer ordered medications to maintain glucose within target range  - Assess barriers to adequate nutritional intake and initiate nutrition consult as needed  - Instruct patient on self management of diabetes  Outcome: Progressing     Problem: Patient/Family Goals  Goal: Patient/Family Long Term Goal  Description: Patient's Long Term Goal: long term care placement    Interventions:  - Monitor vital signs  - Monitor appropriate labs  - Monitor blood glucose levels  - Administer medications per order  - Pain management as needed  - Follow MD orders  - Diagnostics per orders  - Update / inform patient and family on plan of care  - Discharge planning  - See additional Care Plan goals for specific interventions  Outcome: Progressing  Goal: Patient/Family Short Term Goal  Description: Patient's Short Term Goal: feel better    Interventions:   - Monitor vital signs  - Monitor appropriate labs  - Monitor blood glucose levels  - Administer medications per order  - Pain management as needed  - Follow MD orders  - Diagnostics per orders  - Update / inform patient and family on plan of care  - Discharge planning  - See additional  Care Plan goals for specific interventions  Outcome: Progressing     Problem: NEUROLOGICAL - ADULT  Goal: Achieves stable or improved neurological status  Description: INTERVENTIONS  - Assess for and report changes in neurological status  - Initiate measures to prevent increased intracranial pressure  - Maintain blood pressure and fluid volume within ordered parameters to optimize cerebral perfusion and minimize risk of hemorrhage  - Monitor temperature, glucose, and sodium. Initiate appropriate interventions as ordered  Outcome: Progressing  Goal: Achieves maximal functionality and self care  Description: INTERVENTIONS  - Monitor swallowing and airway patency with patient fatigue and changes in neurological status  - Encourage and assist patient to increase activity and self care with guidance from PT/OT  - Encourage visually impaired, hearing impaired and aphasic patients to use assistive/communication devices  Outcome: Progressing

## 2024-09-27 NOTE — DISCHARGE SUMMARY
[facility]  part of [facility] Internal Medicine Discharge Summary   Patient ID:  Wong Boswell  [id number]  73 year old  12/27/1950    Admit date: 9/24/2024    Discharge date and time:  9/27/24    Attending Physician: Lillian Patel MD     Primary Care Physician: Anthony Singh MD     Reason for admission Confusion, anxiety  (see HPI on HP for further detail)    Discharged Condition: stable    Disposition: long term care facility    Risk of Readmission Lace+ Score: 46  59-90 High Risk  29-58 Medium Risk  0-28   Low Risk    Important follow up:  -Consider continued psychiatry care for anxiety management   Discharge meds  I reviewed and reconciled current and discharge medications on the day of discharge with the changes reflected below.       Medication List        START taking these medications      escitalopram 5 MG Tabs  Commonly known as: Lexapro  Take 1 tablet (5 mg total) by mouth nightly.  Notes to patient: Tonight at bedtime.      OLANZapine 2.5 MG Tabs  Commonly known as: ZyPREXA  Take 1 tablet (2.5 mg total) by mouth nightly.  Notes to patient: Tonight at bedtime.             CONTINUE taking these medications      aspirin 81 MG Tabs  Notes to patient: Resume home regimen     atorvastatin 20 MG Tabs  Commonly known as: Lipitor  Take 1 tablet (20 mg total) by mouth every evening.  Notes to patient: Resume home regimen     CALTRATE 600+D OR  Notes to patient: Resume home regimen      * Centrum Silver Adult 50+ Tabs  Notes to patient: Resume home regimen      * multivitamin Tabs  Notes to patient: Resume home regimen      diphenhydrAMINE HCl 25 MG Tabs  Commonly known as: DIPHENHIST  Notes to patient: Resume home regimen      FISH OIL OR  Notes to patient: Resume home regimen      fluticasone propionate 50 MCG/ACT Susp  Commonly known as: Flonase  SHAKE LIQUID AND USE 1 TO 2 SPRAYS IN EACH NOSTRIL DAILY  Notes to patient: Resume home regimen      metFORMIN 500 MG  Tabs  Commonly known as: Glucophage  TAKE 1 TABLET(500 MG) BY MOUTH TWICE DAILY  Notes to patient: Resume home regimen      * OneTouch Delica Lancets Fine Misc  TEST DAILY     * OneTouch Delica Lancets 33G Misc  1 each by Does not apply route daily.     OneTouch Verio Strp  TEST DAILY     pioglitazone 30 MG Tabs  Commonly known as: Actos  TAKE 1 TABLET(30 MG) BY MOUTH DAILY  Notes to patient: Resume home regimen      Polyethylene Glycol 3350 17 g Pack  Commonly known as: MIRALAX  DISSOLVE CONTENTS OF 1 PACKET IN LIQUID AND DRINK DAILY AS NEEDED     Trulicity 0.75 MG/0.5ML Sopn  Generic drug: Dulaglutide  Inject 0.75 mg into the skin once a week.  Notes to patient: Resume home regimen            * This list has 4 medication(s) that are the same as other medications prescribed for you. Read the directions carefully, and ask your doctor or other care provider to review them with you.                   Where to Get Your Medications        You can get these medications from any pharmacy    Bring a paper prescription for each of these medications  escitalopram 5 MG Tabs  OLANZapine 2.5 MG Tabs       HPI per chart  Mr. Boswell is a 74 yo M with PMH of HTN, HL, DM2, who presented with confusion. Patient states everything snapped today when he received a $140 water bill. 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 States he is struggling and did not know who to call. States he initially called the police. No SI/HI, not hearing voices. Does state his vision has been blurry, told he has cataracts and glaucoma, wears glasses but has not seen an eye doctor in many years. Has broken teeth and cavities but has not seen a dentist, does complain of pain and fullness. No fevers or chills at home. Does not have any close family. Has a sister but she lives in [place] and they are not close. Denies ETOH or drug use.      In the ED, BP  elevated, other vitals stable, labs normal except mildly elevated BG.   Hospital Course  Mr. Boswell is a 74 yo M with PMH of HTN, HL, DM2, who presented with confusion.  Upon further review patient more anxious and overwhelmed and unable to manage on his own.  Seen by psychiatry.  SW arrange LT placement at [facility].  BS normalized (had not been taking meds) and Bp improved without furhter meds.    Discharge Diagnoses:   AMS  - unclear baseline, no known hx of psychiatric disorders, patient states everything snapped when he got a $140 water bill today and he couldn't do it anymore, no SI/HI  - no focal neuro deficits  - CT head negative  - will consult psychiatry->ok for dc         Elevated  HTN  - BP elevated in ED, denies hs of HTN  - given amlodipine in ED  -Was going to start medications scheduled but his blood pressure is markedly improved, monitor     DM2  - home regimen: previously on metformin and pioglitazone- currently off all meds as they ran out and he has not seen PCP in over 3 years  - accuchecks QID, hypoglycemic protocol, SSI  -resume home meds      HL  - previously on statin     Dental cares/dental fracture  - dental XR, rule out abscess->neg    Glaucoma  - f/u ophtho    Consults: IP CONSULT TO SOCIAL WORK  IP CONSULT TO SOCIAL WORK  IP CONSULT TO SPIRITUAL CARE  IP CONSULT TO PSYCHIATRY    Radiology: XR PANORAMIC OF JAWS (CPT=70355)    Result Date: 9/25/2024  PROCEDURE: XR PANORAMIC OF JAWS (CPT=70355)  COMPARISON: None.  INDICATIONS: Dental pain, dental fracture, concern for abscess  TECHNIQUE: A single Panorex view of the mandible was obtained.   Impression:  No dental samuel or periapical lucency Finalized by (CST): Quentin Lopez MD on 9/25/2024 at 12:04 PM          XR CHEST AP PORTABLE  (CPT=71045)    Result Date: 9/24/2024  PROCEDURE: XR CHEST AP PORTABLE  (CPT=71045) TIME: 1529.   COMPARISON: None.  INDICATIONS: Altered mental status x couple days.  TECHNIQUE:   Single view.    FINDINGS:  CARDIAC/VASC: The cardiomediastinal silhouette is not enlarged. There is mild tortuosity of the thoracic aorta with peripheral atherosclerotic vascular calcification. The pulmonary vascularity is within normal limits. MEDIAST/SPEEDY: No visible mass or adenopathy. LUNGS/PLEURA: No airspace consolidation, pleural effusion, or pneumothorax is evident.  BONES: Mild degenerative changes of the thoracic spine are apparent. OTHER:   Negative.          CONCLUSION:   Negative for radiographically evident acute intrathoracic process.   elm-remote  Dictated by (CST): Darwin Colindres MD on 9/24/2024 at 4:12 PM     Finalized by (CST): Darwin Colindres MD on 9/24/2024 at 4:13 PM          CT BRAIN OR HEAD (CPT=70450)    Result Date: 9/24/2024  PROCEDURE: CT BRAIN OR HEAD (CPT=70450)  COMPARISON: None.  INDICATIONS: Transient alteration of awareness.  TECHNIQUE: CT images were obtained without contrast material.  Automated exposure control for dose reduction was used.  Dose information is transmitted to the ACR (American College of Radiology) NRDR (National Radiology Data Registry) which includes the Dose Index Registry.  FINDINGS:  CSF SPACES: The ventricles, cisterns, and sulci are commensurate in caliber and appropriate for age. No hydrocephalus, subarachnoid hemorrhage, or effacement of the basal cisterns is appreciated. There is no extra-axial fluid collection. CEREBRUM: No acute intraparenchymal hemorrhage, edema, or cortical sulcal effacement is apparent. There is no space-occupying lesion, mass effect, or shift of midline structures. The gray-white matter junction is preserved and bilaterally symmetric in appearance.  Mild decreased attenuation bilateral white matter likely chronic microvascular white matter ischemia. CEREBELLUM: No edema, hemorrhage, mass, acute infarction, or significant atrophy.  BRAINSTEM: No edema, hemorrhage, mass, acute infarction, or significant atrophy.  CALVARIUM: There is no apparent  depressed fracture, mass, or other significant visible lesion.  SINUSES: There is moderate opacification with mucosal thickening involving the ethmoid air cells.  Mucosal thickening including nodular mucosal thickening of the frontal sinus is noted with some mild left maxillary sinus mucosal thickening. ORBITS: Limited views are grossly unremarkable.  OTHER: Negative.          CONCLUSION:   No acute intracranial abnormality.  Mild chronic microvascular white matter ischemia.    Dictated by (CST): Blaise Patel MD on 9/24/2024 at 3:32 PM     Finalized by (CST): Blaise aPtel MD on 9/24/2024 at 3:35 PM             Operative Procedures:      Day of discharge still feels shocky but improving.  No CP or SOB, no nvd    Exam  Vitals:    09/27/24 0955   BP: 128/85   Pulse: 59   Resp: 16   Temp: 97.4 °F (36.3 °C)     No acute distress, alert and orientedx3  Lungs Clear  Heart Regular  Abdomen Benign    Total Time Coordinating Care: > than 30 minutes  Note: This chart was prepared using voice recognition software and may contain unintended word substitution errors.     Patient had opportunity to ask questions and state understand and agree with therapeutic plan as outlined

## 2024-09-27 NOTE — PROGRESS NOTES
Patient is a 73 year old   male lives on his own with past medical history of HTN, HL, and T2DM who was admitted to the hospital for Altered mental status, unspecified altered mental status type: The patient has been demonstrating confusion and bizarre behaviors. Patient indicated for psych consult for evaluation and advise.    Consult Duration      The patient seen for over 50-minute, follow-up evaluation, over 50% counseling and coordinating care addressing altered mental status.     Record reviewed, communication with attending, communication with RN and patient seen face to face evaluation.     History of Present Illness:   Per chart review, the patient presented to the hospital on 9/24/24 with altered mental status.     Psychotropic medications received: lexapro 5mg nightly , Zyprexa 2.5mg,     Labs and imaging reviewed: Glucose 158. No additional 24 hour labs.    Vital signs: 128/85, HR 59, resp 16, temp 97.6    The patient seen today in hospital gown laying in bed.     Pt states he feels \" a whole lot better, I was completely gone on Tuesday\". Pt endorses feelings of clarity compared to when he presented to the ED.     Pt reports he slept \"a few hours last night\". He is happy with the medications as this sleep is an improvement for him.    He continues to express stress and rumination about his old home and his concerned about his personal belongings. We discussed that the doors to his home are locked and if he were to go to a intermediate community he can still visit his home as desired.     He is going to try to focus on one day at a time and remain present.     The patient denies auditory or visual hallucinations.  The patient denies suicidal or homicidal ideation.    The patient has been demonstrating odd, bizarre, concerning behaviors with paranoia, delusional ideations and severe anxiety with difficulty tolerating stress.    He acknowledging he cannot care for himself and he can do the  old house he needs someone to care for him. We dissussed the option of a nursing home community after discharge and he agees to communicate with social work to explore.     Past Psychiatric/Medication History:  1. Prior diagnoses: No official diagnosis or treatment  2. Past psychiatric inpatient: unknown  3. Past outpatient history: \"some in college\"   4. Past suicide history: unknown  5. Medication history: unknown    Social History:   Pt lives alone and is . He has no children. He lives in a very old home that his a great concern to him.      Family History:  Not able to obtain   Medical History:   Past Medical History  Past Medical History:    Arthritis    Dyslipidemia associated with type 2 diabetes mellitus (HCC)    Essential hypertension    Kidney stone    Subclinical hypothyroidism    Type 2 diabetes mellitus (HCC)       Past Surgical History  Past Surgical History:   Procedure Laterality Date    Elbow surgery Left 1962    Dislocation    Incision and drainage  08/2016    Abscess back    Incision and drainage  03/13/2018    Abscess back    Knee surgery Left 1975    Meniscal tear    Skin surgery N/A 05/18/2018    Excision epidermal inclusion cyst, midback       Family History  Family History   Problem Relation Age of Onset    Heart Disease Father         Details unknown    Diabetes Father     Hypertension Father     Diabetes Mother     Hypertension Mother     Other (CHF) Mother     Diabetes Paternal Grandfather     Diabetes Brother     Hypertension Brother     Glaucoma Neg        Social History  Social History     Socioeconomic History    Marital status:     Number of children: 0   Occupational History    Occupation: computer software,      Comment: retired   Tobacco Use    Smoking status: Every Day     Current packs/day: 0.50     Average packs/day: 0.5 packs/day for 26.0 years (13.0 ttl pk-yrs)     Types: Cigarettes    Smokeless tobacco: Never    Tobacco comments:     less than half a pack  a day   Vaping Use    Vaping status: Never Used   Substance and Sexual Activity    Alcohol use: No     Alcohol/week: 0.0 standard drinks of alcohol    Drug use: No   Other Topics Concern    Pt has a pacemaker No    Pt has a defibrillator No    Reaction to local anesthetic No     Social Determinants of Health     Food Insecurity: Food Insecurity Present (9/24/2024)    Food Insecurity     Food Insecurity: Sometimes true   Transportation Needs: Unmet Transportation Needs (9/24/2024)    Transportation Needs     Lack of Transportation: Yes   Housing Stability: Low Risk  (9/24/2024)    Housing Stability     Housing Instability: No           Current Medications:  Current Facility-Administered Medications   Medication Dose Route Frequency    ibuprofen (Motrin) tab 400 mg  400 mg Oral Q6H PRN    escitalopram (Lexapro) tab 5 mg  5 mg Oral Nightly    OLANZapine (ZyPREXA) tab 2.5 mg  2.5 mg Oral Nightly    glucose (Dex4) 15 GM/59ML oral liquid 15 g  15 g Oral Q15 Min PRN    Or    glucose (Glutose) 40% oral gel 15 g  15 g Oral Q15 Min PRN    Or    glucose-vitamin C (Dex-4) chewable tab 4 tablet  4 tablet Oral Q15 Min PRN    Or    dextrose 50% injection 50 mL  50 mL Intravenous Q15 Min PRN    Or    glucose (Dex4) 15 GM/59ML oral liquid 30 g  30 g Oral Q15 Min PRN    Or    glucose (Glutose) 40% oral gel 30 g  30 g Oral Q15 Min PRN    Or    glucose-vitamin C (Dex-4) chewable tab 8 tablet  8 tablet Oral Q15 Min PRN    heparin (Porcine) 5000 UNIT/ML injection 5,000 Units  5,000 Units Subcutaneous Q8H RONY    acetaminophen (Tylenol Extra Strength) tab 500 mg  500 mg Oral Q4H PRN    ondansetron (Zofran) 4 MG/2ML injection 4 mg  4 mg Intravenous Q6H PRN    insulin aspart (NovoLOG) 100 Units/mL FlexPen 1-5 Units  1-5 Units Subcutaneous TID CC     Medications Prior to Admission   Medication Sig    Multiple Vitamins-Minerals (CENTRUM SILVER ADULT 50+) Oral Tab Take 1 tablet by mouth daily.    Calcium Carbonate-Vitamin D (CALTRATE 600+D OR)  Take 1 tablet by mouth daily.    Omega-3 Fatty Acids (FISH OIL OR) Take 1 capsule by mouth 2 (two) times daily.    POLYETHYLENE GLYCOL 3350 17 g Oral Powd Pack DISSOLVE CONTENTS OF 1 PACKET IN LIQUID AND DRINK DAILY AS NEEDED    METFORMIN 500 MG Oral Tab TAKE 1 TABLET(500 MG) BY MOUTH TWICE DAILY    PIOGLITAZONE 30 MG Oral Tab TAKE 1 TABLET(30 MG) BY MOUTH DAILY    FLUTICASONE PROPIONATE 50 MCG/ACT Nasal Suspension SHAKE LIQUID AND USE 1 TO 2 SPRAYS IN EACH NOSTRIL DAILY    atorvastatin 20 MG Oral Tab Take 1 tablet (20 mg total) by mouth every evening.    Dulaglutide (TRULICITY) 0.75 MG/0.5ML Subcutaneous Solution Pen-injector Inject 0.75 mg into the skin once a week.    OneTouch Delica Lancets 33G Does not apply Misc 1 each by Does not apply route daily.    Glucose Blood (ONETOUCH VERIO) In Vitro Strip TEST DAILY    ONETOUCH DELICA LANCETS FINE Does not apply Misc TEST DAILY    OCUVITE Oral Tab Take 1 tablet by mouth daily.    aspirin 81 MG Oral Tab Take 1 tablet (81 mg total) by mouth daily.    DiphenhydrAMINE HCl 25 MG Oral Tab Take 1 tablet (25 mg total) by mouth 2 (two) times daily.       Allergies  No Known Allergies    Review of Systems:   As by Admitting/Attending    Results:   Laboratory Data:  Lab Results   Component Value Date    WBC 10.9 09/25/2024    HGB 13.8 09/25/2024    HCT 39.5 09/25/2024    .0 09/25/2024    CREATSERUM 0.85 09/25/2024    BUN 16 09/25/2024     09/25/2024    K 3.7 09/25/2024     09/25/2024    CO2 24.0 09/25/2024     (H) 09/25/2024    CA 9.0 09/25/2024    ALB 4.5 09/24/2024    ALKPHO 68 09/24/2024    TP 6.8 09/24/2024    AST 23 09/24/2024    ALT 27 09/24/2024    T4F 1.2 08/12/2022    TSH 3.078 09/24/2024    PSA 0.40 08/12/2022    ETOH <3 09/24/2024         Imaging:  XR CHEST AP PORTABLE  (CPT=71045)    Result Date: 9/24/2024  CONCLUSION:   Negative for radiographically evident acute intrathoracic process.   elm-remote  Dictated by (CST): Darwin Colindres MD on  9/24/2024 at 4:12 PM     Finalized by (CST): Darwin Colindres MD on 9/24/2024 at 4:13 PM          CT BRAIN OR HEAD (CPT=70450)    Result Date: 9/24/2024  CONCLUSION:   No acute intracranial abnormality.  Mild chronic microvascular white matter ischemia.    Dictated by (CST): Blaise Patel MD on 9/24/2024 at 3:32 PM     Finalized by (CST): Blaise Patel MD on 9/24/2024 at 3:35 PM           Vital Signs:   Blood pressure 128/85, pulse 59, temperature 97.4 °F (36.3 °C), temperature source Oral, resp. rate 16, height 75\", weight 94.4 kg (208 lb 1.6 oz), SpO2 97%.    Mental Status Exam:   Appearance: Stated age male, in hospital gown, laying down in hospital bed. Patient is appropriately groomed and appears comfortable.  Psychomotor: Patient demonstrating no restlessness and mild agitation.  No bizarre behaviors observed  Orientation: Alert and oriented to person, place, and event.   Gait: Not evaluated.  Attitude/Coorperation: Patient was cooperative and attentive.  Behavior: No bizarre behavior observed.  Speech: Normal rate and rhythm, not pressured speech   Mood: Anxious, worried about the future   Affect: Anxious and restricted, congruent with mood    Thought process: Disorganized, with some flight of ideas  Thought content: Patient denies any suicidal or homicidal ideation.  Perceptions: Patient denies auditory or visual hallucinations   Concentration: Grossly impaired, ruminates on the house and the future   Memory: Grossly intact   Intellect: Average.  Judgment and Insight: Questionable as evidenced by fragility and lack of insight on current situation     Impression:     Generalized anxiety disorder with panic attacks.  Rule out schizoid Personality Disorder.  Altered mental status, unspecified altered mental status type  Hypertension, unspecified type    Patient is a 73 year old   male with past medical history of HTN, HL, and T2DM who was admitted to the hospital for Altered mental  status, unspecified altered mental status type: The patient has been demonstrating confusion and bizarre behaviors. Patient indicated for psych consult for evaluation and advise.    9/25/2024: The patient has been demonstrating excessive anxiety, impairment in his ability to cope with distress, peculiar thought process and personality quality.    9/26/2024: The patient continued to express anxiety and rumminating thoughts about his living situation. We discussed the option of a [facility] community. Pt has spoken to his estranged sister yesterday and will work on the future of the old home they co-own. Took meds overnight and slept \"a few hours\".     9/27/2024: The pt feels \" a whole lot better\" today.. Pt endorses feelings of clarity compared to when he presented to the ED. Pt reports he slept \"a few hours last night\". He is happy with the medications as this sleep is an improvement for him.    Discussed risk and benefit, acknowledging the current symptom and severity.  At this point, I would recommend the following approach:     Focus on safety. Continue safety precautions.   Focus on education and support.  Focus on insight orientation helping the patient understand diagnosis and treatment plan.  Continue Lexapro 5 mg nightly will  Continue Zyprexa 2.5 mg nightly  Processed with patient at length, the initiation of the above psychotropic medications I advised the patient of the risks, benefits, alternatives and potential side effects. The patient consents to administration of the medications and understands the right to refuse medications at any time. The patient verbalized understanding.   Coordinate plan with team.      Orders This Visit:  Orders Placed This Encounter   Procedures    CBC With Differential With Platelet    Comp Metabolic Panel (14)    Ethyl Alcohol    TSH W Reflex To Free T4    Urinalysis with Culture Reflex    Basic Metabolic Panel (8)    CBC, Platelet; No Differential    Hemoglobin A1C    Drug  screen 8 w/out confirmation, urine    Rapid SARS-CoV-2 by PCR       Meds This Visit:  Requested Prescriptions      No prescriptions requested or ordered in this encounter       Andrew Castillo MD  9/27/2024    Note to Patient: The 21st Century Cures Act makes medical notes like these available to patients in the interest of transparency. However, be advised this is a medical document. It is intended as peer to peer communication. It is written in medical language and may contain abbreviations or verbiage that are unfamiliar. It may appear blunt or direct. Medical documents are intended to carry relevant information, facts as evident, and the clinical opinion of the practitioner. This note may have been transcribed using a voice dictation system. Voice recognition errors may occur. This should not be taken to alter the content or meaning of this note.

## 2024-09-27 NOTE — PLAN OF CARE
Problem: Patient Centered Care  Goal: Patient preferences are identified and integrated in the patient's plan of care  Description: Interventions:  - What would you like us to know as we care for you? Lives alone  - Provide timely, complete, and accurate information to patient/family  - Incorporate patient and family knowledge, values, beliefs, and cultural backgrounds into the planning and delivery of care  - Encourage patient/family to participate in care and decision-making at the level they choose  - Honor patient and family perspectives and choices  Outcome: Adequate for Discharge     Problem: Diabetes/Glucose Control  Goal: Glucose maintained within prescribed range  Description: INTERVENTIONS:  - Monitor Blood Glucose as ordered  - Assess for signs and symptoms of hyperglycemia and hypoglycemia  - Administer ordered medications to maintain glucose within target range  - Assess barriers to adequate nutritional intake and initiate nutrition consult as needed  - Instruct patient on self management of diabetes  Outcome: Adequate for Discharge     Problem: Patient/Family Goals  Goal: Patient/Family Long Term Goal  Description: Patient's Long Term Goal: long term care placement    Interventions:  - Monitor vital signs  - Monitor appropriate labs  - Monitor blood glucose levels  - Administer medications per order  - Pain management as needed  - Follow MD orders  - Diagnostics per orders  - Update / inform patient and family on plan of care  - Discharge planning  - See additional Care Plan goals for specific interventions  Outcome: Adequate for Discharge  Goal: Patient/Family Short Term Goal  Description: Patient's Short Term Goal: feel better    Interventions:   - Monitor vital signs  - Monitor appropriate labs  - Monitor blood glucose levels  - Administer medications per order  - Pain management as needed  - Follow MD orders  - Diagnostics per orders  - Update / inform patient and family on plan of care  -  Discharge planning  - See additional Care Plan goals for specific interventions  Outcome: Adequate for Discharge     Problem: NEUROLOGICAL - ADULT  Goal: Achieves stable or improved neurological status  Description: INTERVENTIONS  - Assess for and report changes in neurological status  - Initiate measures to prevent increased intracranial pressure  - Maintain blood pressure and fluid volume within ordered parameters to optimize cerebral perfusion and minimize risk of hemorrhage  - Monitor temperature, glucose, and sodium. Initiate appropriate interventions as ordered  Outcome: Adequate for Discharge  Goal: Achieves maximal functionality and self care  Description: INTERVENTIONS  - Monitor swallowing and airway patency with patient fatigue and changes in neurological status  - Encourage and assist patient to increase activity and self care with guidance from PT/OT  - Encourage visually impaired, hearing impaired and aphasic patients to use assistive/communication devices  Outcome: Adequate for Discharge

## 2024-09-27 NOTE — CM/SW NOTE
09/27/24 1000   Discharge disposition   Expected discharge disposition [facility]   Post Acute Care Provider   (Malvin Rebolledo)   Discharge transportation [facility]     SW confirmed with Dr. Patel and RN Akiko that pt is medically ready for discharge today. CRAI discussed with liaison Paula to arrange a time for discharge.  CARI scheduled [facility] for 1:00 PM.  RN is aware of discharge time and location and will inform patient/ family.  Lucas Rebolledo will continue to work with patient on financials for supportive living home.  RN to attach IP Transfer Report to After Visit Summary packet for transfer to [facility].  CARI confirmed PASRR screen was completed.     PLAN: DC to [facility] at 1:00 PM.  PCS completed.    RN # to report: [phone]    Zainab Jasmine MSW, LSW [id number]

## 2024-10-07 ENCOUNTER — LAB REQUISITION (OUTPATIENT)
Dept: LAB | Facility: HOSPITAL | Age: 74
End: 2024-10-07
Payer: MEDICARE

## 2024-10-07 DIAGNOSIS — R76.12 NONSPECIFIC REACTION TO CELL MEDIATED IMMUNITY MEASUREMENT OF GAMMA INTERFERON ANTIGEN RESPONSE WITHOUT ACTIVE TUBERCULOSIS: ICD-10-CM

## 2024-10-07 PROCEDURE — 86480 TB TEST CELL IMMUN MEASURE: CPT | Performed by: INTERNAL MEDICINE

## 2024-10-09 LAB
M TB IFN-G CD4+ T-CELLS BLD-ACNC: 0.04 IU/ML
M TB TUBERC IFN-G BLD QL: NEGATIVE
M TB TUBERC IGNF/MITOGEN IGNF CONTROL: >10 IU/ML
QFT TB1 AG MINUS NIL: 0.03 IU/ML
QFT TB2 AG MINUS NIL: 0.04 IU/ML

## (undated) NOTE — MR AVS SNAPSHOT
Elsa  [address]  [phone]               Thank you for choosing us for your health care visit with Cralie Wadsworth MD.  We are glad to serve you and happy to provide you with this summary 1 lancet by Finger stick route 2 (two) times daily. Use as directed. CALTRATE 600+D OR   Take 1 tablet by mouth daily. * CENTRUM SILVER ADULT 50+ Tabs   Take 1 tablet by mouth daily.            * multivitamin Tabs   Take 1 tablet by mout

## (undated) NOTE — LETTER
[address] [facility]  [phone]         December 21, 2018        Harman Delaney      Dear Susan Burton:      I recently contacted you to notify Melina Pompa is no longer with [facility] so

## (undated) NOTE — IP AVS SNAPSHOT
Andrew Iqbal [address]  [address] [place] [address]  [phone]               Thank you for choosing us for your health care visit with Chloé Olsen RN.   We are glad to serve you and happy to provide you with t Instructions and Information about Your Health     None      Allergies as of Apr 19, 2017     No Known Allergies                   Current Medications      TAKE these medications     ADVIL OR   Take 1 tablet by mouth every 6 (six) hours.            aspir

## (undated) NOTE — MR AVS SNAPSHOT
[place]a  [address]  [phone]               Thank you for choosing us for your health care visit with [facility], OD.   We are glad to serve you and happy to provide you with this summary of aspirin 81 MG Tabs   Take 81 mg by mouth daily.            atorvastatin 20 MG Tabs   TAKE 1 TABLET(20 MG) BY MOUTH EVERY EVENING   Commonly known as:  LIPITOR           SHRUTI CONTOUR NEXT EZ w/Device Kit   1 Device by Other route 2 (two) times daily

## (undated) NOTE — Clinical Note
May 17, 2017         Campos Perez MD  [facility] [address]      Patient: Gregory Lagunas   YOB: 1950   Date of Visit: 5/17/2017       Dear Dr. Lynne Arenas MD,    I saw your patient, Gregory Lagunas, on 5/17/2017.  Enclosed

## (undated) NOTE — LETTER
09/18/18        Madonna Szymanski  [address] [place] [address]      Dear Margarita Chacon,    Our records indicate that you have outstanding lab work and or testing that was ordered for you and has not yet been completed:  Orders Placed This Encounter

## (undated) NOTE — MR AVS SNAPSHOT
[address]  [address]  [phone]               Thank you for choosing us for your health care visit with Mylene Pathak DPM.  We are glad to serve you and happy to provide yo Jun 13, 2017  1:00 PM   Exam - Established with [facility], [address] [facility] ([facility])    [address] [phone]              Follow-up Instructions     Return * Notice: This list has 2 medication(s) that are the same as other medications prescribed for you. Read the directions carefully, and ask your doctor or other care provider to review them with you.             MyChart     Visit [facility]  You can access you

## (undated) NOTE — LETTER
7/12/2021    Ben Zavaleta            Dear Emilia Becker,      Our records indicate that you are due for an appointment for a Colonoscopy in August 2021, or shortly there after, with Devang Nickerson

## (undated) NOTE — LETTER
[address] [facility] [address]      Dear Cheryl Maner:    Adriana Hargrove had previously enrolled in our Chronic Care Management program and had been speaking with your care manag

## (undated) NOTE — LETTER
7/3/2018              [facility] [address] [facility] [address]         To Whom It May Concern,    Juror number [id number] Cheryl Anger a patient under my medical care.  He is

## (undated) NOTE — MR AVS SNAPSHOT
[place]a  [address]  [phone]               Thank you for choosing us for your health care visit with Armen Damon MD.  We are glad to serve you and happy to provide you with this summary US KIDNEY/BLADDER (CPT=76770)    Complete by: May 25, 2017 (Approximate)    Assoc Dx:  Urinary frequency [R35.0], Kidney stones [N20.0]                 Follow-up Instructions     Return in about 3 weeks (around 6/15/2017).       Scheduling Instructions recent med list.                ADVIL OR   Take 1 tablet by mouth every 6 (six) hours. aspirin 81 MG Tabs   Take 81 mg by mouth daily.            atorvastatin 20 MG Tabs   TAKE 1 TABLET(20 MG) BY MOUTH EVERY EVENING   Commonly known as:  LIPITOR MyChart     Visit [facility]  You can access your MyChart to more actively manage your health care and view more details from this visit by going to [email or web address].   If you've recently had a stay at the Hospital you can access your dischar DASH eating plan Adopt a diet rich in fruits, vegetables, and low fat dairy products with reduced content of saturated and total fat.    Dietary sodium reduction Reduce dietary sodium intake to <= 100 mmol per day (2.4 g sodium or 6 g sodium chloride)   Aer

## (undated) NOTE — MR AVS SNAPSHOT
[facility] \[facility]\"" - [place] [address] [place]  [address] [phone]               Thank you for choosing us for your health care visit with Marjan Pavon MD.  We are glad to serve you and happy to provide you with this summary of Cast[address]. [address] [facility] ([address] [address] [phone]              Reason for Today's Visit     Lesion     Full Skin Exam           Instructions and Information about Your He If you've recently had a stay at the Hospital you can access your discharge instructions in [facility] by going to Visits < Admission Summaries.  If you've been to the Emergency Department or your doctor's office, you can view your past visit information in My